# Patient Record
Sex: MALE | Race: WHITE | NOT HISPANIC OR LATINO | Employment: OTHER | URBAN - METROPOLITAN AREA
[De-identification: names, ages, dates, MRNs, and addresses within clinical notes are randomized per-mention and may not be internally consistent; named-entity substitution may affect disease eponyms.]

---

## 2017-02-23 ENCOUNTER — COMMUNICATION - HEALTHEAST (OUTPATIENT)
Dept: FAMILY MEDICINE | Facility: CLINIC | Age: 77
End: 2017-02-23

## 2017-02-24 ENCOUNTER — RECORDS - HEALTHEAST (OUTPATIENT)
Dept: GENERAL RADIOLOGY | Facility: CLINIC | Age: 77
End: 2017-02-24

## 2017-02-24 ENCOUNTER — OFFICE VISIT - HEALTHEAST (OUTPATIENT)
Dept: FAMILY MEDICINE | Facility: CLINIC | Age: 77
End: 2017-02-24

## 2017-02-24 DIAGNOSIS — R05.9 COUGH: ICD-10-CM

## 2017-04-19 ENCOUNTER — RECORDS - HEALTHEAST (OUTPATIENT)
Dept: ADMINISTRATIVE | Facility: OTHER | Age: 77
End: 2017-04-19

## 2017-08-09 ENCOUNTER — AMBULATORY - HEALTHEAST (OUTPATIENT)
Dept: FAMILY MEDICINE | Facility: CLINIC | Age: 77
End: 2017-08-09

## 2017-08-09 ENCOUNTER — COMMUNICATION - HEALTHEAST (OUTPATIENT)
Dept: SCHEDULING | Facility: CLINIC | Age: 77
End: 2017-08-09

## 2017-08-14 ENCOUNTER — COMMUNICATION - HEALTHEAST (OUTPATIENT)
Dept: FAMILY MEDICINE | Facility: CLINIC | Age: 77
End: 2017-08-14

## 2017-08-27 ENCOUNTER — COMMUNICATION - HEALTHEAST (OUTPATIENT)
Dept: FAMILY MEDICINE | Facility: CLINIC | Age: 77
End: 2017-08-27

## 2017-08-27 DIAGNOSIS — E78.5 HYPERLIPIDEMIA: ICD-10-CM

## 2017-08-28 ENCOUNTER — COMMUNICATION - HEALTHEAST (OUTPATIENT)
Dept: FAMILY MEDICINE | Facility: CLINIC | Age: 77
End: 2017-08-28

## 2017-08-30 ENCOUNTER — RECORDS - HEALTHEAST (OUTPATIENT)
Dept: GENERAL RADIOLOGY | Facility: CLINIC | Age: 77
End: 2017-08-30

## 2017-08-30 ENCOUNTER — OFFICE VISIT - HEALTHEAST (OUTPATIENT)
Dept: FAMILY MEDICINE | Facility: CLINIC | Age: 77
End: 2017-08-30

## 2017-08-30 DIAGNOSIS — R05.9 COUGH: ICD-10-CM

## 2017-12-28 ENCOUNTER — OFFICE VISIT - HEALTHEAST (OUTPATIENT)
Dept: FAMILY MEDICINE | Facility: CLINIC | Age: 77
End: 2017-12-28

## 2017-12-28 DIAGNOSIS — N43.3 HYDROCELE: ICD-10-CM

## 2018-01-10 ENCOUNTER — OFFICE VISIT - HEALTHEAST (OUTPATIENT)
Dept: FAMILY MEDICINE | Facility: CLINIC | Age: 78
End: 2018-01-10

## 2018-01-10 DIAGNOSIS — E78.5 HYPERLIPIDEMIA: ICD-10-CM

## 2018-01-10 DIAGNOSIS — Z00.00 HEALTH CARE MAINTENANCE: ICD-10-CM

## 2018-01-10 LAB
ALBUMIN SERPL-MCNC: 3.5 G/DL (ref 3.5–5)
ALP SERPL-CCNC: 73 U/L (ref 45–120)
ALT SERPL W P-5'-P-CCNC: 13 U/L (ref 0–45)
ANION GAP SERPL CALCULATED.3IONS-SCNC: 8 MMOL/L (ref 5–18)
AST SERPL W P-5'-P-CCNC: 19 U/L (ref 0–40)
BILIRUB SERPL-MCNC: 0.6 MG/DL (ref 0–1)
BUN SERPL-MCNC: 15 MG/DL (ref 8–28)
CALCIUM SERPL-MCNC: 9.3 MG/DL (ref 8.5–10.5)
CHLORIDE BLD-SCNC: 108 MMOL/L (ref 98–107)
CHOLEST SERPL-MCNC: 169 MG/DL
CO2 SERPL-SCNC: 27 MMOL/L (ref 22–31)
CREAT SERPL-MCNC: 0.97 MG/DL (ref 0.7–1.3)
ERYTHROCYTE [DISTWIDTH] IN BLOOD BY AUTOMATED COUNT: 13.2 % (ref 11–14.5)
FASTING STATUS PATIENT QL REPORTED: YES
GFR SERPL CREATININE-BSD FRML MDRD: >60 ML/MIN/1.73M2
GLUCOSE BLD-MCNC: 95 MG/DL (ref 70–125)
HCT VFR BLD AUTO: 45.9 % (ref 40–54)
HDLC SERPL-MCNC: 63 MG/DL
HGB BLD-MCNC: 15.1 G/DL (ref 14–18)
LDLC SERPL CALC-MCNC: 95 MG/DL
MCH RBC QN AUTO: 27.7 PG (ref 27–34)
MCHC RBC AUTO-ENTMCNC: 32.9 G/DL (ref 32–36)
MCV RBC AUTO: 84 FL (ref 80–100)
PLATELET # BLD AUTO: 223 THOU/UL (ref 140–440)
PMV BLD AUTO: 7.9 FL (ref 7–10)
POTASSIUM BLD-SCNC: 4.7 MMOL/L (ref 3.5–5)
PROT SERPL-MCNC: 6.5 G/DL (ref 6–8)
RBC # BLD AUTO: 5.45 MILL/UL (ref 4.4–6.2)
SODIUM SERPL-SCNC: 143 MMOL/L (ref 136–145)
TRIGL SERPL-MCNC: 53 MG/DL
WBC: 4.7 THOU/UL (ref 4–11)

## 2018-01-12 ENCOUNTER — COMMUNICATION - HEALTHEAST (OUTPATIENT)
Dept: FAMILY MEDICINE | Facility: CLINIC | Age: 78
End: 2018-01-12

## 2018-03-12 ENCOUNTER — OFFICE VISIT - HEALTHEAST (OUTPATIENT)
Dept: FAMILY MEDICINE | Facility: CLINIC | Age: 78
End: 2018-03-12

## 2018-03-12 DIAGNOSIS — J44.1 COPD WITH EXACERBATION (H): ICD-10-CM

## 2018-03-26 ENCOUNTER — OFFICE VISIT - HEALTHEAST (OUTPATIENT)
Dept: FAMILY MEDICINE | Facility: CLINIC | Age: 78
End: 2018-03-26

## 2018-03-26 DIAGNOSIS — R05.9 COUGH: ICD-10-CM

## 2018-03-26 DIAGNOSIS — J18.9 CAP (COMMUNITY ACQUIRED PNEUMONIA): ICD-10-CM

## 2018-03-26 ASSESSMENT — MIFFLIN-ST. JEOR: SCORE: 1663.81

## 2018-03-27 ENCOUNTER — COMMUNICATION - HEALTHEAST (OUTPATIENT)
Dept: FAMILY MEDICINE | Facility: CLINIC | Age: 78
End: 2018-03-27

## 2018-03-29 ENCOUNTER — COMMUNICATION - HEALTHEAST (OUTPATIENT)
Dept: FAMILY MEDICINE | Facility: CLINIC | Age: 78
End: 2018-03-29

## 2018-03-29 DIAGNOSIS — J44.9 COPD (CHRONIC OBSTRUCTIVE PULMONARY DISEASE) (H): ICD-10-CM

## 2018-04-10 ENCOUNTER — OFFICE VISIT - HEALTHEAST (OUTPATIENT)
Dept: FAMILY MEDICINE | Facility: CLINIC | Age: 78
End: 2018-04-10

## 2018-04-10 DIAGNOSIS — J44.9 COPD (CHRONIC OBSTRUCTIVE PULMONARY DISEASE) (H): ICD-10-CM

## 2018-08-01 ENCOUNTER — RECORDS - HEALTHEAST (OUTPATIENT)
Dept: ADMINISTRATIVE | Facility: OTHER | Age: 78
End: 2018-08-01

## 2018-09-09 ENCOUNTER — COMMUNICATION - HEALTHEAST (OUTPATIENT)
Dept: FAMILY MEDICINE | Facility: CLINIC | Age: 78
End: 2018-09-09

## 2019-02-07 ENCOUNTER — COMMUNICATION - HEALTHEAST (OUTPATIENT)
Dept: FAMILY MEDICINE | Facility: CLINIC | Age: 79
End: 2019-02-07

## 2019-02-07 DIAGNOSIS — G47.00 INSOMNIA: ICD-10-CM

## 2019-02-07 DIAGNOSIS — E78.5 HYPERLIPIDEMIA: ICD-10-CM

## 2019-05-13 ENCOUNTER — COMMUNICATION - HEALTHEAST (OUTPATIENT)
Dept: FAMILY MEDICINE | Facility: CLINIC | Age: 79
End: 2019-05-13

## 2019-05-13 DIAGNOSIS — E78.5 HYPERLIPIDEMIA: ICD-10-CM

## 2019-05-13 DIAGNOSIS — G47.00 INSOMNIA: ICD-10-CM

## 2019-08-15 ENCOUNTER — COMMUNICATION - HEALTHEAST (OUTPATIENT)
Dept: FAMILY MEDICINE | Facility: CLINIC | Age: 79
End: 2019-08-15

## 2019-08-15 DIAGNOSIS — E78.5 HYPERLIPIDEMIA: ICD-10-CM

## 2019-08-15 DIAGNOSIS — G47.00 INSOMNIA: ICD-10-CM

## 2019-09-03 ENCOUNTER — OFFICE VISIT - HEALTHEAST (OUTPATIENT)
Dept: FAMILY MEDICINE | Facility: CLINIC | Age: 79
End: 2019-09-03

## 2019-09-03 DIAGNOSIS — L98.9 SKIN LESION: ICD-10-CM

## 2019-09-03 DIAGNOSIS — Z00.00 HEALTH CARE MAINTENANCE: ICD-10-CM

## 2019-09-03 LAB
ALBUMIN SERPL-MCNC: 3.8 G/DL (ref 3.5–5)
ALP SERPL-CCNC: 72 U/L (ref 45–120)
ALT SERPL W P-5'-P-CCNC: 13 U/L (ref 0–45)
ANION GAP SERPL CALCULATED.3IONS-SCNC: 8 MMOL/L (ref 5–18)
AST SERPL W P-5'-P-CCNC: 19 U/L (ref 0–40)
BILIRUB SERPL-MCNC: 0.6 MG/DL (ref 0–1)
BUN SERPL-MCNC: 19 MG/DL (ref 8–28)
CALCIUM SERPL-MCNC: 9.4 MG/DL (ref 8.5–10.5)
CHLORIDE BLD-SCNC: 106 MMOL/L (ref 98–107)
CHOLEST SERPL-MCNC: 167 MG/DL
CO2 SERPL-SCNC: 26 MMOL/L (ref 22–31)
CREAT SERPL-MCNC: 1.18 MG/DL (ref 0.7–1.3)
ERYTHROCYTE [DISTWIDTH] IN BLOOD BY AUTOMATED COUNT: 12.4 % (ref 11–14.5)
FASTING STATUS PATIENT QL REPORTED: YES
GFR SERPL CREATININE-BSD FRML MDRD: 60 ML/MIN/1.73M2
GLUCOSE BLD-MCNC: 83 MG/DL (ref 70–125)
HCT VFR BLD AUTO: 42.6 % (ref 40–54)
HDLC SERPL-MCNC: 66 MG/DL
HGB BLD-MCNC: 14.3 G/DL (ref 14–18)
LDLC SERPL CALC-MCNC: 89 MG/DL
MCH RBC QN AUTO: 29.2 PG (ref 27–34)
MCHC RBC AUTO-ENTMCNC: 33.6 G/DL (ref 32–36)
MCV RBC AUTO: 87 FL (ref 80–100)
PLATELET # BLD AUTO: 221 THOU/UL (ref 140–440)
PMV BLD AUTO: 7.8 FL (ref 7–10)
POTASSIUM BLD-SCNC: 4.1 MMOL/L (ref 3.5–5)
PROT SERPL-MCNC: 6.3 G/DL (ref 6–8)
RBC # BLD AUTO: 4.91 MILL/UL (ref 4.4–6.2)
SODIUM SERPL-SCNC: 140 MMOL/L (ref 136–145)
TRIGL SERPL-MCNC: 60 MG/DL
WBC: 7.1 THOU/UL (ref 4–11)

## 2019-09-05 ENCOUNTER — COMMUNICATION - HEALTHEAST (OUTPATIENT)
Dept: FAMILY MEDICINE | Facility: CLINIC | Age: 79
End: 2019-09-05

## 2019-09-10 ENCOUNTER — COMMUNICATION - HEALTHEAST (OUTPATIENT)
Dept: FAMILY MEDICINE | Facility: CLINIC | Age: 79
End: 2019-09-10

## 2019-10-30 ENCOUNTER — OFFICE VISIT - HEALTHEAST (OUTPATIENT)
Dept: FAMILY MEDICINE | Facility: CLINIC | Age: 79
End: 2019-10-30

## 2019-10-30 ENCOUNTER — COMMUNICATION - HEALTHEAST (OUTPATIENT)
Dept: FAMILY MEDICINE | Facility: CLINIC | Age: 79
End: 2019-10-30

## 2019-10-30 DIAGNOSIS — J44.9 CHRONIC OBSTRUCTIVE PULMONARY DISEASE, UNSPECIFIED COPD TYPE (H): ICD-10-CM

## 2019-10-30 DIAGNOSIS — Z23 NEED FOR INFLUENZA VACCINATION: ICD-10-CM

## 2019-10-30 DIAGNOSIS — M54.50 ACUTE LEFT-SIDED LOW BACK PAIN WITHOUT SCIATICA: ICD-10-CM

## 2019-11-09 ENCOUNTER — COMMUNICATION - HEALTHEAST (OUTPATIENT)
Dept: FAMILY MEDICINE | Facility: CLINIC | Age: 79
End: 2019-11-09

## 2019-11-09 DIAGNOSIS — E78.5 HYPERLIPIDEMIA: ICD-10-CM

## 2019-11-27 ENCOUNTER — OFFICE VISIT - HEALTHEAST (OUTPATIENT)
Dept: FAMILY MEDICINE | Facility: CLINIC | Age: 79
End: 2019-11-27

## 2019-11-27 ENCOUNTER — RECORDS - HEALTHEAST (OUTPATIENT)
Dept: GENERAL RADIOLOGY | Facility: CLINIC | Age: 79
End: 2019-11-27

## 2019-11-27 DIAGNOSIS — M54.42 LUMBAGO WITH SCIATICA, LEFT SIDE: ICD-10-CM

## 2019-11-27 DIAGNOSIS — G89.29 OTHER CHRONIC PAIN: ICD-10-CM

## 2019-11-27 DIAGNOSIS — M54.42 CHRONIC BILATERAL LOW BACK PAIN WITH BILATERAL SCIATICA: ICD-10-CM

## 2019-11-27 DIAGNOSIS — M54.41 CHRONIC BILATERAL LOW BACK PAIN WITH BILATERAL SCIATICA: ICD-10-CM

## 2019-11-27 DIAGNOSIS — G89.29 CHRONIC BILATERAL LOW BACK PAIN WITH BILATERAL SCIATICA: ICD-10-CM

## 2019-11-27 DIAGNOSIS — M54.41 LUMBAGO WITH SCIATICA, RIGHT SIDE: ICD-10-CM

## 2019-11-27 ASSESSMENT — MIFFLIN-ST. JEOR: SCORE: 1568.55

## 2019-12-04 ENCOUNTER — HOSPITAL ENCOUNTER (OUTPATIENT)
Dept: PHYSICAL MEDICINE AND REHAB | Facility: CLINIC | Age: 79
Discharge: HOME | End: 2019-12-04
Attending: NURSE PRACTITIONER

## 2019-12-04 ENCOUNTER — COMMUNICATION - HEALTHEAST (OUTPATIENT)
Dept: FAMILY MEDICINE | Facility: CLINIC | Age: 79
End: 2019-12-04

## 2019-12-04 ENCOUNTER — HOSPITAL ENCOUNTER (OUTPATIENT)
Dept: MRI IMAGING | Facility: HOSPITAL | Age: 79
Discharge: HOME | End: 2019-12-04

## 2019-12-04 DIAGNOSIS — M54.41 ACUTE BILATERAL LOW BACK PAIN WITH BILATERAL SCIATICA: ICD-10-CM

## 2019-12-04 DIAGNOSIS — M54.42 ACUTE BILATERAL LOW BACK PAIN WITH BILATERAL SCIATICA: ICD-10-CM

## 2019-12-04 DIAGNOSIS — M51.369 DDD (DEGENERATIVE DISC DISEASE), LUMBAR: ICD-10-CM

## 2019-12-04 DIAGNOSIS — M41.9 SCOLIOSIS OF LUMBAR SPINE, UNSPECIFIED SCOLIOSIS TYPE: ICD-10-CM

## 2019-12-05 ENCOUNTER — COMMUNICATION - HEALTHEAST (OUTPATIENT)
Dept: PHYSICAL MEDICINE AND REHAB | Facility: CLINIC | Age: 79
End: 2019-12-05

## 2019-12-05 ENCOUNTER — COMMUNICATION - HEALTHEAST (OUTPATIENT)
Dept: FAMILY MEDICINE | Facility: CLINIC | Age: 79
End: 2019-12-05

## 2019-12-05 ENCOUNTER — AMBULATORY - HEALTHEAST (OUTPATIENT)
Dept: PHYSICAL MEDICINE AND REHAB | Facility: CLINIC | Age: 79
End: 2019-12-05

## 2019-12-05 DIAGNOSIS — M54.41 ACUTE BILATERAL LOW BACK PAIN WITH BILATERAL SCIATICA: ICD-10-CM

## 2019-12-05 DIAGNOSIS — M54.42 ACUTE BILATERAL LOW BACK PAIN WITH BILATERAL SCIATICA: ICD-10-CM

## 2019-12-05 DIAGNOSIS — M48.061 SPINAL STENOSIS, LUMBAR REGION, WITHOUT NEUROGENIC CLAUDICATION: ICD-10-CM

## 2019-12-05 DIAGNOSIS — M51.369 DDD (DEGENERATIVE DISC DISEASE), LUMBAR: ICD-10-CM

## 2019-12-05 DIAGNOSIS — M48.061 LUMBAR FORAMINAL STENOSIS: ICD-10-CM

## 2019-12-12 ENCOUNTER — HOSPITAL ENCOUNTER (OUTPATIENT)
Dept: PHYSICAL MEDICINE AND REHAB | Facility: CLINIC | Age: 79
Discharge: HOME | End: 2019-12-12
Attending: PAIN MEDICINE

## 2019-12-12 DIAGNOSIS — M48.061 SPINAL STENOSIS, LUMBAR REGION, WITHOUT NEUROGENIC CLAUDICATION: ICD-10-CM

## 2019-12-12 DIAGNOSIS — M51.369 DDD (DEGENERATIVE DISC DISEASE), LUMBAR: ICD-10-CM

## 2019-12-12 DIAGNOSIS — M54.42 ACUTE BILATERAL LOW BACK PAIN WITH BILATERAL SCIATICA: ICD-10-CM

## 2019-12-12 DIAGNOSIS — M48.061 LUMBAR FORAMINAL STENOSIS: ICD-10-CM

## 2019-12-12 DIAGNOSIS — M54.41 ACUTE BILATERAL LOW BACK PAIN WITH BILATERAL SCIATICA: ICD-10-CM

## 2019-12-16 ENCOUNTER — COMMUNICATION - HEALTHEAST (OUTPATIENT)
Dept: PHYSICAL MEDICINE AND REHAB | Facility: CLINIC | Age: 79
End: 2019-12-16

## 2020-03-12 ENCOUNTER — RECORDS - HEALTHEAST (OUTPATIENT)
Dept: ADMINISTRATIVE | Facility: OTHER | Age: 80
End: 2020-03-12

## 2020-03-16 ENCOUNTER — RECORDS - HEALTHEAST (OUTPATIENT)
Dept: ADMINISTRATIVE | Facility: OTHER | Age: 80
End: 2020-03-16

## 2020-03-24 ENCOUNTER — RECORDS - HEALTHEAST (OUTPATIENT)
Dept: ADMINISTRATIVE | Facility: OTHER | Age: 80
End: 2020-03-24

## 2020-05-26 ENCOUNTER — RECORDS - HEALTHEAST (OUTPATIENT)
Dept: ADMINISTRATIVE | Facility: OTHER | Age: 80
End: 2020-05-26

## 2020-07-15 ENCOUNTER — RECORDS - HEALTHEAST (OUTPATIENT)
Dept: ADMINISTRATIVE | Facility: OTHER | Age: 80
End: 2020-07-15

## 2020-08-16 ENCOUNTER — COMMUNICATION - HEALTHEAST (OUTPATIENT)
Dept: FAMILY MEDICINE | Facility: CLINIC | Age: 80
End: 2020-08-16

## 2020-08-16 DIAGNOSIS — G47.00 INSOMNIA: ICD-10-CM

## 2020-10-12 ENCOUNTER — OFFICE VISIT - HEALTHEAST (OUTPATIENT)
Dept: FAMILY MEDICINE | Facility: CLINIC | Age: 80
End: 2020-10-12

## 2020-10-12 DIAGNOSIS — L57.0 AK (ACTINIC KERATOSIS): ICD-10-CM

## 2020-10-12 DIAGNOSIS — G47.00 INSOMNIA, UNSPECIFIED TYPE: ICD-10-CM

## 2020-10-12 DIAGNOSIS — E78.00 PURE HYPERCHOLESTEROLEMIA: ICD-10-CM

## 2020-10-12 DIAGNOSIS — E78.00 HYPERCHOLESTEREMIA: ICD-10-CM

## 2020-10-12 DIAGNOSIS — E78.5 HYPERLIPIDEMIA: ICD-10-CM

## 2020-10-12 DIAGNOSIS — G47.00 INSOMNIA: ICD-10-CM

## 2020-10-12 DIAGNOSIS — J44.9 CHRONIC OBSTRUCTIVE PULMONARY DISEASE, UNSPECIFIED COPD TYPE (H): ICD-10-CM

## 2020-10-12 LAB
ALBUMIN SERPL-MCNC: 3.7 G/DL (ref 3.5–5)
ALP SERPL-CCNC: 77 U/L (ref 45–120)
ALT SERPL W P-5'-P-CCNC: 19 U/L (ref 0–45)
ANION GAP SERPL CALCULATED.3IONS-SCNC: 7 MMOL/L (ref 5–18)
AST SERPL W P-5'-P-CCNC: 25 U/L (ref 0–40)
BILIRUB SERPL-MCNC: 0.7 MG/DL (ref 0–1)
BUN SERPL-MCNC: 11 MG/DL (ref 8–28)
CALCIUM SERPL-MCNC: 8.9 MG/DL (ref 8.5–10.5)
CHLORIDE BLD-SCNC: 104 MMOL/L (ref 98–107)
CHOLEST SERPL-MCNC: 181 MG/DL
CO2 SERPL-SCNC: 28 MMOL/L (ref 22–31)
CREAT SERPL-MCNC: 0.87 MG/DL (ref 0.7–1.3)
FASTING STATUS PATIENT QL REPORTED: NO
GFR SERPL CREATININE-BSD FRML MDRD: >60 ML/MIN/1.73M2
GLUCOSE BLD-MCNC: 91 MG/DL (ref 70–125)
HDLC SERPL-MCNC: 71 MG/DL
LDLC SERPL CALC-MCNC: 94 MG/DL
POTASSIUM BLD-SCNC: 4 MMOL/L (ref 3.5–5)
PROT SERPL-MCNC: 6.2 G/DL (ref 6–8)
SODIUM SERPL-SCNC: 139 MMOL/L (ref 136–145)
TRIGL SERPL-MCNC: 81 MG/DL

## 2020-10-12 RX ORDER — LOVASTATIN 40 MG
40 TABLET ORAL AT BEDTIME
Qty: 90 TABLET | Refills: 3 | Status: SHIPPED | OUTPATIENT
Start: 2020-10-12 | End: 2021-11-23

## 2020-10-13 ENCOUNTER — COMMUNICATION - HEALTHEAST (OUTPATIENT)
Dept: FAMILY MEDICINE | Facility: CLINIC | Age: 80
End: 2020-10-13

## 2020-11-16 ENCOUNTER — COMMUNICATION - HEALTHEAST (OUTPATIENT)
Dept: FAMILY MEDICINE | Facility: CLINIC | Age: 80
End: 2020-11-16

## 2020-11-16 DIAGNOSIS — G47.00 INSOMNIA: ICD-10-CM

## 2020-11-17 RX ORDER — TRAZODONE HYDROCHLORIDE 50 MG/1
TABLET, FILM COATED ORAL
Qty: 90 TABLET | Refills: 1 | Status: SHIPPED | OUTPATIENT
Start: 2020-11-17 | End: 2022-02-14

## 2021-01-19 ENCOUNTER — RECORDS - HEALTHEAST (OUTPATIENT)
Dept: ADMINISTRATIVE | Facility: OTHER | Age: 81
End: 2021-01-19

## 2021-02-23 ENCOUNTER — IMMUNIZATION (OUTPATIENT)
Dept: NURSING | Facility: CLINIC | Age: 81
End: 2021-02-23
Payer: COMMERCIAL

## 2021-02-23 PROCEDURE — 0001A PR COVID VAC PFIZER DIL RECON 30 MCG/0.3 ML IM: CPT

## 2021-02-23 PROCEDURE — 91300 PR COVID VAC PFIZER DIL RECON 30 MCG/0.3 ML IM: CPT

## 2021-03-16 ENCOUNTER — IMMUNIZATION (OUTPATIENT)
Dept: NURSING | Facility: CLINIC | Age: 81
End: 2021-03-16
Attending: FAMILY MEDICINE
Payer: COMMERCIAL

## 2021-03-16 PROCEDURE — 91300 PR COVID VAC PFIZER DIL RECON 30 MCG/0.3 ML IM: CPT

## 2021-03-16 PROCEDURE — 0002A PR COVID VAC PFIZER DIL RECON 30 MCG/0.3 ML IM: CPT

## 2021-04-09 ENCOUNTER — RECORDS - HEALTHEAST (OUTPATIENT)
Dept: ADMINISTRATIVE | Facility: OTHER | Age: 81
End: 2021-04-09

## 2021-05-28 NOTE — TELEPHONE ENCOUNTER
RN cannot approve Refill Request    RN can NOT refill this medication PCP messaged that patient is overdue for Office Visit.       Martha Tubbs, [facility] Triage/Med Refill 5/13/2019    Requested Prescriptions   Pending Prescriptions Disp Refills     lovastatin (MEVACOR) 40 MG tablet [Pharmacy Med Name: LOVASTATIN 40MG TABLETS] 90 tablet 0     Sig: TAKE 1 TABLET(40 MG) BY MOUTH AT BEDTIME       Statins Refill Protocol (Hmg CoA Reductase Inhibitors) Failed - 5/13/2019  3:20 AM        Failed - PCP or prescribing provider visit in past 12 months      Last office visit with prescriber/PCP: 4/10/2018 Gera Mehta MD OR same dept: Visit date not found OR same specialty: 4/10/2018 Gera Mheta MD  Last physical: Visit date not found Last MTM visit: Visit date not found   Next visit within 3 mo: Visit date not found  Next physical within 3 mo: Visit date not found  Prescriber OR PCP: Gera Mehta MD  Last diagnosis associated with med order: 1. Hyperlipidemia  - lovastatin (MEVACOR) 40 MG tablet [Pharmacy Med Name: LOVASTATIN 40MG TABLETS]; TAKE 1 TABLET(40 MG) BY MOUTH AT BEDTIME  Dispense: 90 tablet; Refill: 0    2. Insomnia  - traZODone (DESYREL) 50 MG tablet [Pharmacy Med Name: TRAZODONE 50MG TABLETS]; TAKE 1 TABLET(50 MG) BY MOUTH AT BEDTIME  Dispense: 90 tablet; Refill: 0    If protocol passes may refill for 12 months if within 3 months of last provider visit (or a total of 15 months).             traZODone (DESYREL) 50 MG tablet [Pharmacy Med Name: TRAZODONE 50MG TABLETS] 90 tablet 0     Sig: TAKE 1 TABLET(50 MG) BY MOUTH AT BEDTIME       Tricyclics/Misc Antidepressant/Antianxiety Meds Refill Protocol Failed - 5/13/2019  3:20 AM        Failed - PCP or prescribing provider visit in last year     Last office visit with prescriber/PCP: 4/10/2018 Gera Mehta MD OR same dept: Visit date not found OR same specialty: 4/10/2018 Gera Mehta MD  Last physical: Visit  date not found Last MTM visit: Visit date not found   Next visit within 3 mo: Visit date not found  Next physical within 3 mo: Visit date not found  Prescriber OR PCP: Gera Mehta MD  Last diagnosis associated with med order: 1. Hyperlipidemia  - lovastatin (MEVACOR) 40 MG tablet [Pharmacy Med Name: LOVASTATIN 40MG TABLETS]; TAKE 1 TABLET(40 MG) BY MOUTH AT BEDTIME  Dispense: 90 tablet; Refill: 0    2. Insomnia  - traZODone (DESYREL) 50 MG tablet [Pharmacy Med Name: TRAZODONE 50MG TABLETS]; TAKE 1 TABLET(50 MG) BY MOUTH AT BEDTIME  Dispense: 90 tablet; Refill: 0    If protocol passes may refill for 12 months if within 3 months of last provider visit (or a total of 15 months).

## 2021-05-30 VITALS — WEIGHT: 203 LBS | BODY MASS INDEX: 29.98 KG/M2

## 2021-05-31 ENCOUNTER — RECORDS - HEALTHEAST (OUTPATIENT)
Dept: ADMINISTRATIVE | Facility: CLINIC | Age: 81
End: 2021-05-31

## 2021-05-31 VITALS — BODY MASS INDEX: 30.48 KG/M2 | WEIGHT: 206.4 LBS

## 2021-05-31 VITALS — WEIGHT: 209 LBS | BODY MASS INDEX: 30.86 KG/M2

## 2021-05-31 VITALS — WEIGHT: 202 LBS | BODY MASS INDEX: 29.83 KG/M2

## 2021-05-31 NOTE — TELEPHONE ENCOUNTER
RN cannot approve Refill Request    RN can NOT refill this medication Protocol failed and NO refill given.       Martha Tubbs, [facility] Triage/Med Refill 8/15/2019    Requested Prescriptions   Pending Prescriptions Disp Refills     lovastatin (MEVACOR) 40 MG tablet [Pharmacy Med Name: LOVASTATIN 40MG TABLETS] 90 tablet 0     Sig: TAKE 1 TABLET(40 MG) BY MOUTH AT BEDTIME       Statins Refill Protocol (Hmg CoA Reductase Inhibitors) Failed - 8/15/2019  3:20 AM        Failed - PCP or prescribing provider visit in past 12 months      Last office visit with prescriber/PCP: 4/10/2018 Gera Mehta MD OR same dept: Visit date not found OR same specialty: 4/10/2018 Gera Mehta MD  Last physical: Visit date not found Last [place] visit: Visit date not found   Next visit within 3 mo: Visit date not found  Next physical within 3 mo: Visit date not found  Prescriber OR PCP: Gera Mehta MD  Last diagnosis associated with med order: 1. Hyperlipidemia  - lovastatin (MEVACOR) 40 MG tablet [Pharmacy Med Name: LOVASTATIN 40MG TABLETS]; TAKE 1 TABLET(40 MG) BY MOUTH AT BEDTIME  Dispense: 90 tablet; Refill: 0    2. Insomnia  - traZODone (DESYREL) 50 MG tablet [Pharmacy Med Name: TRAZODONE 50MG TABLETS]; TAKE 1 TABLET(50 MG) BY MOUTH AT BEDTIME  Dispense: 90 tablet; Refill: 0    If protocol passes may refill for 12 months if within 3 months of last provider visit (or a total of 15 months).             traZODone (DESYREL) 50 MG tablet [Pharmacy Med Name: TRAZODONE 50MG TABLETS] 90 tablet 0     Sig: TAKE 1 TABLET(50 MG) BY MOUTH AT BEDTIME       Tricyclics/Misc Antidepressant/Antianxiety Meds Refill Protocol Failed - 8/15/2019  3:20 AM        Failed - PCP or prescribing provider visit in last year     Last office visit with prescriber/PCP: 4/10/2018 Gera Mehta MD OR same dept: Visit date not found OR same specialty: 4/10/2018 Gera Mehta MD  Last physical: Visit date not found Last  MTM visit: Visit date not found   Next visit within 3 mo: Visit date not found  Next physical within 3 mo: Visit date not found  Prescriber OR PCP: Gera Mehta MD  Last diagnosis associated with med order: 1. Hyperlipidemia  - lovastatin (MEVACOR) 40 MG tablet [Pharmacy Med Name: LOVASTATIN 40MG TABLETS]; TAKE 1 TABLET(40 MG) BY MOUTH AT BEDTIME  Dispense: 90 tablet; Refill: 0    2. Insomnia  - traZODone (DESYREL) 50 MG tablet [Pharmacy Med Name: TRAZODONE 50MG TABLETS]; TAKE 1 TABLET(50 MG) BY MOUTH AT BEDTIME  Dispense: 90 tablet; Refill: 0    If protocol passes may refill for 12 months if within 3 months of last provider visit (or a total of 15 months).

## 2021-06-01 VITALS — WEIGHT: 208.2 LBS | BODY MASS INDEX: 30.75 KG/M2

## 2021-06-01 VITALS — WEIGHT: 209.25 LBS | BODY MASS INDEX: 30.9 KG/M2

## 2021-06-01 VITALS — WEIGHT: 213.5 LBS | BODY MASS INDEX: 31.62 KG/M2 | HEIGHT: 69 IN

## 2021-06-01 NOTE — TELEPHONE ENCOUNTER
Test Results  Who is calling?:  Patient  Who ordered the test:  Dr. Mehta  Type of test: Lab and Other: Skin lesion removal  Date of test:  9/5/19  Where was the test performed:  [place]  What are your questions/concerns?:  Patient had a missed call and is asking if he had any results. Reviewed patient's BMP and HM2 were normal. Did patient have skin lesion results, please reach out to patient and advise.  Okay to leave a detailed message?:  Yes [phone]

## 2021-06-01 NOTE — TELEPHONE ENCOUNTER
----- Message from Gera Mehta MD sent at 9/9/2019 12:35 PM CDT -----  Kidney and liver function are normal with no signs of diabetes.  Cholesterol levels are at goal range.  No concerns on blood work.

## 2021-06-01 NOTE — PROGRESS NOTES
ASSESSMENT/PLAN  1. Health care maintenance  Patient is due for blood work and will obtain blood work today  - HM2(CBC w/o Differential)  - Comprehensive Metabolic Panel  - Lipid Cascade FASTING    2. Skin lesion  Patient has a broken blood vessel and bruising on his right forearm  Discussed monitoring at this time  There is no vesicles or papules or raised lesion  If not healing he will contact me at clinic        SUBJECTIVE:   Chief Complaint   Patient presents with     Skin/SubQ Lesion     Skin spot on right forearm, not healing      Allen Simond 78 y.o. male    Current Outpatient Medications   Medication Sig Dispense Refill     albuterol (PROVENTIL) 2.5 mg /3 mL (0.083 %) nebulizer solution Take 3 mL (2.5 mg total) by nebulization every 6 (six) hours as needed for wheezing. 75 mL 1     albuterol (VENTOLIN HFA) 90 mcg/actuation inhaler Inhale 2 puffs every 4 (four) hours as needed for wheezing (J44.9). 1 Inhaler 0     fluticasone-salmeterol (ADVAIR HFA) 230-21 mcg/actuation inhaler Inhale 2 puffs 2 (two) times a day. 1 Inhaler 3     lovastatin (MEVACOR) 40 MG tablet TAKE 1 TABLET(40 MG) BY MOUTH AT BEDTIME 90 tablet 0     nebulizers Misc Use as directed: q 6 hrs PRN. 1 each 0     sildenafil (VIAGRA) 100 MG tablet Take 100 mg by mouth daily as needed for erectile dysfunction. 1 hour before needed.       traZODone (DESYREL) 50 MG tablet TAKE 1 TABLET(50 MG) BY MOUTH AT BEDTIME 90 tablet 0     No current facility-administered medications for this visit.      Allergies: Patient has no known allergies.   No LMP for male patient.    HPI:   The patient has a lesion on his right forearm they want to have evaluated  Seems consistent with a broken blood vessel/bruising  No raised lesions or irregularities otherwise  Discussed monitoring and this will likely resolve on its own  Patient is fasting and due for fasting blood work which will obtain today and follow-up based on results    ROS: negative except as per  HPI    OBJECTIVE:   The patient appears well, alert, oriented x 3, in no distress.  /72 (Patient Site: Left Arm, Patient Position: Sitting, Cuff Size: Adult Large)   Pulse 79   Temp 97.3  F (36.3  C) (Oral)   Resp 16   Wt 203 lb 3.2 oz (92.2 kg)   BMI 30.01 kg/m      Lungs: clear, good air entry, no wheezes, rhonchi or rales.   Cardiac: S1 and S2 normal, no murmurs, regular rate and rhythm.   Extremities: show no edema, normal peripheral pulses.   Neurological: normal, no focal findings.  Skin: flat, red lesion irregular R forearm- no vesicles, no papules- lesion appears as bruising  Psych- normal mood and affect      Pt states an understanding and agrees to the above plan.

## 2021-06-02 ENCOUNTER — RECORDS - HEALTHEAST (OUTPATIENT)
Dept: ADMINISTRATIVE | Facility: CLINIC | Age: 81
End: 2021-06-02

## 2021-06-02 NOTE — TELEPHONE ENCOUNTER
Medication Question or Clarification  Who is calling: Pharmacy: fax  What medication are you calling about? (include dose and sig)   MG capsule  2 mg, Oral, 3 times daily         Summary: Take 1 capsule (2 mg total) by mouth 3 (three) times a day., Starting Wed 10/30/2019, Normal   Dose, Frequency: 2 mg, 3 times daily  Start: 10/30/2019  Ord/Sold: 10/30/2019 (O)  Report  Adh:   Taking:   Long-term:   Pharmacy: [id number] - [place], [place] - [address] [place] [address] DR AT [facility] [address]  Med Dose History        Who prescribed the medication?: Stephenie Beth CNP  What is your question/concern?: The insurance did not approve the tizanidine.  The alternativ offered is balofen.  Does provider want the alternative or PA started?   Pharmacy: [facility] [phone]  Okay to leave a detailed message?: Yes  Site CMT - Please call the pharmacy to obtain any additional needed information.
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
## 2021-06-02 NOTE — PATIENT INSTRUCTIONS - HE
The Science of a Long Life by Dr Shine Monique    Take Acetaminophen (Tylenol) 325-650 mg every 4-6 hours as needed for pain or fever. Do not take more than 4000 mg in a 24 hour period.

## 2021-06-03 VITALS
DIASTOLIC BLOOD PRESSURE: 72 MMHG | TEMPERATURE: 97.3 F | RESPIRATION RATE: 16 BRPM | HEART RATE: 79 BPM | BODY MASS INDEX: 30.01 KG/M2 | WEIGHT: 203.2 LBS | SYSTOLIC BLOOD PRESSURE: 118 MMHG

## 2021-06-03 VITALS
RESPIRATION RATE: 16 BRPM | SYSTOLIC BLOOD PRESSURE: 138 MMHG | HEART RATE: 73 BPM | TEMPERATURE: 97.1 F | DIASTOLIC BLOOD PRESSURE: 73 MMHG | BODY MASS INDEX: 29.86 KG/M2 | WEIGHT: 202.2 LBS
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
## 2021-06-03 NOTE — TELEPHONE ENCOUNTER
Refill Approved    Rx renewed per Medication Renewal Policy. Medication was last renewed on 8/15/2019.  Last office visit: 10[facility]30/2019 [facility] 11/9/2019     Requested Prescriptions   Pending Prescriptions Disp Refills     lovastatin (MEVACOR) 40 MG tablet [Pharmacy Med Name: LOVASTATIN 40MG TABLETS] 90 tablet 0     Sig: TAKE 1 TABLET(40 MG) BY MOUTH AT BEDTIME       Statins Refill Protocol (Hmg CoA Reductase Inhibitors) Passed - 11/9/2019  3:15 PM        Passed - PCP or prescribing provider visit in past 12 months      Last office visit with prescriber/PCP: 9/3/2019 Gera Mehta MD OR same dept: 10/30/2019 Stephenie Beth CNP OR same specialty: 10/30/2019 Stephenie Beth CNP  Last physical: Visit date not found Last MTM visit: Visit date not found   Next visit within 3 mo: Visit date not found  Next physical within 3 mo: Visit date not found  Prescriber OR PCP: Gera Mehta MD  Last diagnosis associated with med order: 1. Hyperlipidemia  - lovastatin (MEVACOR) 40 MG tablet [Pharmacy Med Name: LOVASTATIN 40MG TABLETS]; TAKE 1 TABLET(40 MG) BY MOUTH AT BEDTIME  Dispense: 90 tablet; Refill: 0    If protocol passes may refill for 12 months if within 3 months of last provider visit (or a total of 15 months).

## 2021-06-03 NOTE — TELEPHONE ENCOUNTER
Who is calling:  Gaurav with [facility], [phone]  Reason for Call:    Gaurav is requesting the XR Lumbar Spine 2 or 3 VWS, Images and Report done 11/27/19 to be sent to [facility].  Thank you.  Date of last appointment with primary care: 11/27/19  Okay to leave a detailed message: Yes

## 2021-06-03 NOTE — PATIENT INSTRUCTIONS - HE
[facility] Scheduling    Please call [phone] to schedule your image(s) (select option #1). There are 3 different locations, see below. You can do walk-in visits for xray only images if you want.     [address] [address]    [facility]   [address] [address]    [place] [id number] [facility] [address]    Gabapentin Dosing Chart  DATE  MORNING AFTERNOON BEDTIME    Today 0 0 1     1 0 1     1 1 1     1 1 2     2 1 2     2 2 2     2 2 3     3 2 3     3 3 3

## 2021-06-03 NOTE — TELEPHONE ENCOUNTER
Left message letting them know that this needs to be requested through the [facility] and they can call them at [phone] to request.
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
## 2021-06-04 VITALS — BODY MASS INDEX: 33.09 KG/M2 | WEIGHT: 205 LBS

## 2021-06-04 VITALS
RESPIRATION RATE: 20 BRPM | HEIGHT: 66 IN | BODY MASS INDEX: 32.62 KG/M2 | DIASTOLIC BLOOD PRESSURE: 62 MMHG | HEART RATE: 79 BPM | SYSTOLIC BLOOD PRESSURE: 126 MMHG | WEIGHT: 203 LBS | TEMPERATURE: 97.1 F

## 2021-06-04 NOTE — TELEPHONE ENCOUNTER
----- Message from Stephenie Beth CNP sent at 12/5/2019 12:39 PM CST -----  Please call patient with results of x-ray. He has severe disc space narrowing at L3-L5 which can be painful in lower back. Also has moderate disc space narrowing of the L1-L2, L2-3, and L5-S1. Some moderate arthritis between L3-S1. All of this can be causing his pain. Will have him follow with the [facility] for treatment options.

## 2021-06-04 NOTE — PATIENT INSTRUCTIONS - HE
DISCHARGE INSTRUCTIONS    During office hours (8:00 a.m.- 4:00 p.m.) questions or concerns may be answered  by calling [facility] Center Navigation Nurses at  [phone].  Messages received after hours will be returned the following business day.      In the case of an emergency, please dial 911 or seek assistance at the nearest Emergency Room/Urgent Care facility.     All Patients:    ? You may experience an increase in your symptoms for the first 2 days (It may take anywhere between 2 days- 2 weeks for the steroid to have maximum effect).    ? You may use ice on the injection site, as frequently as 20 minutes each hour if needed.    ? You may take your pain medicine.    ? You may continue taking your regular medication after your injection. If you have had a Medial Branch Block you may resume pain medication once your pain diary is completed.    ? You may shower. No swimming, tub bath or hot tub for 48 hours.  You may remove your bandaid/bandage as soon as you are home.    ? You may resume light activities, as tolerated.    ? Resume your usual diet as tolerated.    ? It is strongly advised that you do not drive for 1-3 hours post injection.    ? If you have had oral sedation:  Do not drive for 8 hours post injection.      ? If you have had IV sedation:  Do not drive for 24 hours post injection.  Do not operate hazardous machinery or make important personal/business decisions for 24 hours.      POSSIBLE STEROID SIDE EFFECTS (If steroid/cortisone was used for your procedure)    -If you experience these symptoms, it should only last for a short period      Swelling of the legs                Skin redness (flushing)       Mouth (oral) irritation     Blood sugar (glucose) levels              Sweats                      Mood changes    Headache    Sleeplessness         POSSIBLE PROCEDURE SIDE EFFECTS  -Call the Spine Center if you are concerned    Increased Pain             Increased numbness/tingling         Nausea/Vomiting            Bruising/bleeding at site        Redness or swelling                                                Difficulty walking        Weakness             Fever greater than 100.5    *In the event of a severe headache after an epidural steroid injection that is relieved by lying down, please call the [facility] to speak with a clinical staff member*

## 2021-06-04 NOTE — TELEPHONE ENCOUNTER
----- Message from Sara Lassiter CNP sent at 12/5/2019  7:23 AM CST -----  Please call patient and notify him that I did review his lumbar spine MRI which does show severe spinal canal stenosis at L4-5 due to disc osteophyte complex and facet arthritis, moderate spinal canal stenosis noted at L5-S1.  As we discussed we could trial a bilateral L4-5 transforaminal epidural steroid injection see if we get further relief of his back and buttock pain, I did place an order for this injection since his pain is severe and he can schedule at this time.  If he does not get relief with this injection we could trial a bilateral L5-S1 TFESI versus surgical referral as he would be a surgical candidate if he gets no relief with conservative care.

## 2021-06-04 NOTE — TELEPHONE ENCOUNTER
Phone call to patient to review results and provider's recommendations. Results given and explained. Discussed the offered/recommended injection. Patient would like to try this. Injection requirements reviewed with patient. Stated understanding. Transferred to scheduling to make appointment.

## 2021-06-04 NOTE — TELEPHONE ENCOUNTER
"Patient calling as he was told by pharmacy he should be taking the Gabapentin 1 capsule three times a day and he is taking 3 three times a day. He is not seeing any change in his pain and wants to come off. Explained provider would be updated, but he should not just stop the medication. He should slowly decrease the medication (i.e. 2 three times a day today, 1 three times a day tomorrow, 1 two times a day the next day, then 1 daily, then off.) Stated understanding.     He reports he does not have the pain he had prior to the injection of 12/12, but continues to have stiffness. \"It is what it is. I'll call if I need to come back.\"   "

## 2021-06-04 NOTE — TELEPHONE ENCOUNTER
Patient Returning Call  Reason for call:  Results  Information relayed to patient: The writer read the following to patient per Stephenie Beth CNP: Please call patient with results of x-ray. He has severe disc space narrowing at L3-L5 which can be painful in lower back. Also has moderate disc space narrowing of the L1-L2, L2-3, and L5-S1. Some moderate arthritis between L3-S1. All of this can be causing his pain. Will have him follow with the [facility] for treatment options.   Patient has additional questions:  Yes  If YES, what are your questions/concerns:  Patient  has an appointment next week Thursday to get a steroid injection in his back he states.  Okay to leave a detailed message?: No call back needed

## 2021-06-05 VITALS
HEART RATE: 64 BPM | DIASTOLIC BLOOD PRESSURE: 67 MMHG | OXYGEN SATURATION: 96 % | RESPIRATION RATE: 24 BRPM | TEMPERATURE: 96.9 F | WEIGHT: 189.8 LBS | BODY MASS INDEX: 30.63 KG/M2 | SYSTOLIC BLOOD PRESSURE: 134 MMHG

## 2021-06-09 NOTE — PROGRESS NOTES
ASSESSMENT & PLAN:  1.Cough  Suspect bronchitis  No signs of pneumonia on xray- white blood cell count normal  Will start z-pack  Cont inhaler  F/u if not improving at clinic      There are no Patient Instructions on file for this visit.    Orders Placed This Encounter   Procedures     XR Chest PA and Lateral     Standing Status:   Future     Number of Occurrences:   1     Standing Expiration Date:   2/25/2018     Order Specific Question:   Can the procedure be changed per Radiologist protocol?     Answer:   Yes     HM1 (CBC with Diff)     Medications Discontinued During This Encounter   Medication Reason     traZODone (DESYREL) 50 MG tablet Reorder       No Follow-up on file.    CHIEF COMPLAINT:  Chief Complaint   Patient presents with     URI     Coughing X3 weeks with shortness of breath, wheezing, nasal congestion        HISTORY OF PRESENT ILLNESS:  Allen is a 76 y.o. male presenting to the clinic today with complaints of cough, shortness of breath, and congestion. He has had constant rhinorrhea and a productive cough for the past couple of weeks. The phlegm is thick in consistency, so it is quite difficult for him to clear his throat. He has had some chills, fatigue, and fever. He initially had body aches, but those seem to be resolving. He is short of breath and has noticed himself wheezing. He has been using cough drops, but he has not taken any other over the counter medications. He has been coughing throughout the night and would like something better for the cough. He has had pneumonia many times in the past. His last pneumonia was a few years ago. He used to get it more frequently when he was smoking, but he has since quit. His chest X-ray from today shows fluid in the right lower lobe, but his white blood cell count is normal. He has an albuterol inhaler, which he has been using. He will start a Z-Gael.     REVIEW OF SYSTEMS:   He denies nausea, vomiting, or diarrhea. All other systems are  negative.    PFSH:  He quit smoking.     TOBACCO USE:  History   Smoking Status     Former Smoker     Quit date: 8/17/2002   Smokeless Tobacco     Not on file     Comment: quit apx 13yrs ago       VITALS:  Vitals:    02/24/17 0851   BP: 124/70   Patient Site: Left Arm   Patient Position: Sitting   Cuff Size: Adult Large   Pulse: 66   Resp: 18   Temp: 98.1  F (36.7  C)   TempSrc: Oral   Weight: 203 lb (92.1 kg)     Wt Readings from Last 3 Encounters:   02/24/17 203 lb (92.1 kg)   11/01/16 203 lb (92.1 kg)   06/21/16 211 lb (95.7 kg)       PHYSICAL EXAM:  GENERAL APPEARANCE: Alert, cooperative, no distress, appears stated age   LUNGS: Wheezing throughout lung fields, coarse rhonchi in left lower lobe.   HEART: Regular rate and rhythm, S1 and S2 normal, no murmur, rub, or gallop. No lower extremity edema.   NEUROLOGIC: No gross focal deficits  PSYCHOLOGIC: Normal mood and affect    Chest X-ray: Fluid in right lower lobe, no focalized infiltrate- some atelectasis      ADDITIONAL HISTORY SUMMARIZED (FROM OLD RECORDS OR HISTORY FROM SOMEONE OTHER THAN THE PATIENT OR ANOTHER HEALTHCARE PROVIDER) (2 TOTAL): None.     DECISION TO OBTAIN EXTRA INFORMATION (OLD RECORDS REQUESTED OR HISTORY FROM ANOTHER PERSON OR ACCESSING CARE EVERYWHERE) (1 TOTAL): None.     RADIOLOGY TESTS SUMMARIZED OR ORDERED (XRAY/CT/MRI/DXA) (1 TOTAL): Chest X-ray ordered.     LABS REVIEWED OR ORDERED (1 TOTAL): Labs ordered and reviewed today.    MEDICINE TESTS SUMMARIZED OR ORDERED (EKG/ECHO) (1 TOTAL): None.    INDEPENDENT REVIEW OF EKG OR X-RAY (2 EACH): Chest X-ray from today personally interpreted.     The visit lasted a total of 15 minutes face to face with the patient. Over 50% of the time was spent counseling and educating the patient about his cough and congestion.    Colton CABAN, am scribing for and in the presence of, Dr. Mehta.    IDr. Mehta, personally performed the services described in this documentation, as scribed by  Colton Peterson in my presence, and it is both accurate and complete.    MEDICATIONS:  Current Outpatient Prescriptions   Medication Sig Dispense Refill     lovastatin (MEVACOR) 40 MG tablet Take 1 tablet (40 mg total) by mouth bedtime. 90 tablet 3     sildenafil (VIAGRA) 100 MG tablet Take 100 mg by mouth daily as needed for erectile dysfunction. 1 hour before needed.       traZODone (DESYREL) 50 MG tablet Take 1 tablet (50 mg total) by mouth bedtime. 180 tablet 1     azithromycin (ZITHROMAX Z-SARAH) 250 MG tablet Take 2 tablets (500 mg) on  Day 1,  followed by 1 tablet (250 mg) once daily on Days 2 through 5. 6 tablet 0     codeine-guaiFENesin (GUAIFENESIN AC)  mg/5 mL liquid Take 5 mL by mouth 2 (two) times a day as needed for cough. 240 mL 0     predniSONE (DELTASONE) 20 MG tablet Take 1 tablet (20 mg total) by mouth daily for 10 days. 7 tablet 0     No current facility-administered medications for this visit.        Total Data Points: 4

## 2021-06-10 NOTE — TELEPHONE ENCOUNTER
Refill Approved    Rx renewed per Medication Renewal Policy. Medication was last renewed on 8/15/209.    Martha Tubbs, [facility] Connection Triage/Med Refill 8/17/2020     Requested Prescriptions   Pending Prescriptions Disp Refills     traZODone (DESYREL) 50 MG tablet [Pharmacy Med Name: TRAZODONE 50MG TABLETS] 90 tablet 0     Sig: TAKE 1 TABLET(50 MG) BY MOUTH AT BEDTIME       Tricyclics/Misc Antidepressant/Antianxiety Meds Refill Protocol Passed - 8/16/2020 10:03 AM        Passed - PCP or prescribing provider visit in last year     Last office visit with prescriber/PCP: 9/3/2019 Gera Mehta MD OR same dept: 11/27/2019 Stephenie Beth CNP OR same specialty: 11/27/2019 Stephenie Beth CNP  Last physical: Visit date not found Last MTM visit: Visit date not found   Next visit within 3 mo: Visit date not found  Next physical within 3 mo: Visit date not found  Prescriber OR PCP: Gera Mehta MD  Last diagnosis associated with med order: 1. Insomnia  - traZODone (DESYREL) 50 MG tablet [Pharmacy Med Name: TRAZODONE 50MG TABLETS]; TAKE 1 TABLET(50 MG) BY MOUTH AT BEDTIME  Dispense: 90 tablet; Refill: 0    If protocol passes may refill for 12 months if within 3 months of last provider visit (or a total of 15 months).

## 2021-06-12 NOTE — PROGRESS NOTES
SUBJECTIVE:   Chief Complaint   Patient presents with     URI     Coughing, wheezing, shortness of breath - finished course of Prednisone      Allen Grubbs 76 y.o. male    Current Outpatient Prescriptions   Medication Sig Dispense Refill     albuterol (PROVENTIL) 2.5 mg /3 mL (0.083 %) nebulizer solution Take 3 mL (2.5 mg total) by nebulization every 6 (six) hours as needed for wheezing. 75 mL 1     codeine-guaiFENesin (GUAIFENESIN AC)  mg/5 mL liquid Take 5 mL by mouth 2 (two) times a day as needed for cough. 240 mL 0     lovastatin (MEVACOR) 40 MG tablet Take 1 tablet (40 mg total) by mouth bedtime. 90 tablet 3     lovastatin (MEVACOR) 40 MG tablet TAKE 1 TABLET BY MOUTH EVERY DAY 90 tablet 1     nebulizers Misc Use as directed: q 6 hrs PRN. 1 each 0     sildenafil (VIAGRA) 100 MG tablet Take 100 mg by mouth daily as needed for erectile dysfunction. 1 hour before needed.       traZODone (DESYREL) 50 MG tablet Take 1 tablet (50 mg total) by mouth bedtime. 180 tablet 1     fluticasone (FLOVENT HFA) 110 mcg/actuation inhaler Inhale 1 puff 2 (two) times a day. 1 Inhaler 0     predniSONE (DELTASONE) 20 MG tablet Take 1 tablet (20 mg total) by mouth daily. 7 tablet 0     No current facility-administered medications for this visit.      Allergies: Review of patient's allergies indicates no known allergies.   No LMP for male patient.    HPI:   Patient is following up for recurring coughing problems and some shortness of breath.  He does have a history on his chart of COPD but is not recall ever being told that he had this.  He recently completed a course of prednisone which he noted that he felt great while on the prednisone his breathing was improved and he had no coughing shortly after completion of the course of prednisone his cough has returned.  He still feels that he is not lacking in endurance he can do things actively and not get markedly out of breath but he continues to have coughing throughout the day  and night.  Discussed with this could be symptom of uncontrolled COPD but he is worried about the possibility of bronchitis or pneumonia and wanting antibiotics-we did obtain chest x-ray today showing no signs of pneumonia and we discussed this with blood work with him today.  We did discuss with him another course of prednisone as we start him on a inhaler to help control his coughing and improve his slight amount of breathing abnormalities.  We will start him on Flovent 1 puff twice daily and another burst of prednisone at this time.    ROS: negative except as per HPI    OBJECTIVE:   The patient appears well, alert, oriented x 3, in no distress.  /74 (Patient Site: Left Arm, Patient Position: Sitting, Cuff Size: Adult Regular)  Pulse 78  Temp 98.9  F (37.2  C) (Oral)   Resp 24  Wt 202 lb (91.6 kg)  BMI 29.83 kg/m2    Lungs: Good air movement with mild rhonchi heard at the bases no wheezing  Cardiac: S1 and S2 normal, no murmurs, regular rate and rhythm.   Abdomen: normal bowel sounds, soft   Extremities: show no edema, normal peripheral pulses.   Neurological: normal, no focal findings.  Skin: clear, dry, no rashes/lesions  Psych- normal mood and affect      ASSESSMENT/PLAN  1. Cough  Suspect uncontrolled COPD  Restart Flovent 1 puff twice daily  Another seven-day course of prednisone will be sent to his pharmacy as well as the inhaler starts taken to affect  Reviewed x-ray findings and lab work with him today  Patient will follow-up if symptoms are not improved  - HM1(CBC and Differential)  - XR Chest PA and Lateral; Future  - HM1 (CBC with Diff)      Pt states an understanding and agrees to the above plan.

## 2021-06-12 NOTE — PROGRESS NOTES
ASSESSMENT and PLAN:  1. AK (actinic keratosis)  Lesion was frozen off today with liquid nitrogen.  I gave him the opportunity to do a shave excision and sent for biopsy but he indicated he would come back if lesion does not heal.  Tolerated well.  See below for procedure note     2. Chronic obstructive pulmonary disease, unspecified COPD type (H)  Since quitting smoking he does not rely on inhalers.  No other symptoms consistent with COPD.  Long-term history of smoking.    3. Essential Hypercholesterolemia  Continue lovastatin 40 mg daily    4. Insomnia, unspecified type  Patient using trazodone with good benefit.  No take daytime grogginess.  - Comprehensive Metabolic Panel  - traZODone (DESYREL) 50 MG tablet; TAKE 1 TABLET(50 MG) BY MOUTH AT BEDTIME  Dispense: 90 tablet; Refill: 4             There are no Patient Instructions on file for this visit.    Orders Placed This Encounter   Procedures     Pneumococcal polysaccharide vaccine 23-valent 3 yo or older, subq/IM     Comprehensive Metabolic Panel     Lipid Rescue FASTING     Order Specific Question:   Fasting is required?     Answer:   Yes     Medications Discontinued During This Encounter   Medication Reason     gabapentin (NEURONTIN) 100 MG capsule      lovastatin (MEVACOR) 40 MG tablet Reorder     traZODone (DESYREL) 50 MG tablet Reorder     albuterol (PROVENTIL) 2.5 mg /3 mL (0.083 %) nebulizer solution      albuterol (VENTOLIN HFA) 90 mcg/actuation inhaler      fluticasone-salmeterol (ADVAIR HFA) 230-21 mcg/actuation inhaler        No follow-ups on file.    DATA REVIEWED:    The visit lasted a total of  20min  -5 minutes spent performing procedure minutes face to face with the patient. Over 50% of the time was spent counseling and educating the patient     CHIEF COMPLAINT:  Chief Complaint   Patient presents with     Mass     Growth on top of left knee       HISTORY OF PRESENT ILLNESS:  Allen Grubbs is a 80 y.o. male who presents today for medication  management and evaluation of an atypical skin lesion on his left knee that he notes to be present for many months.  He tends to scratch it off and it bleeds.  Has had a basal cell carcinoma in the past that has been removed    Used to be heavy smoker. Had pneumonia in January. Dx the following February again and quit on the spot . Walks daily.  Denies any shortness of breath or cardiac symptoms of angina.    He is no longer needing inhalers.    He continues to use lovastatin 40 mg for cholesterol.    Takes trazodone every night for sleep.  No daytime grogginess.    REVIEW OF SYSTEMS:    Comprehensive review of systems is negative except as noted in the HPI.     PFSH:  Tobacco use and medications reviewed below.     TOBACCO USE:  Social History     Tobacco Use   Smoking Status Former Smoker     Quit date: 2002     Years since quittin.1   Smokeless Tobacco Never Used   Tobacco Comment    quit apx 13yrs ago       VITALS:  Vitals:    10/12/20 1508   BP: 134/67   Pulse: 64   Resp: 24   Temp: 96.9  F (36.1  C)   TempSrc: Oral   SpO2: 96%   Weight: 189 lb 12.8 oz (86.1 kg)     Wt Readings from Last 3 Encounters:   10/12/20 189 lb 12.8 oz (86.1 kg)   19 205 lb (93 kg)   19 203 lb (92.1 kg)       PHYSICAL EXAM:  Constitutional:  Reveals a 80 y.o. male in NAD.  Vitals:  Per nursing notes.  Neck:  Supple, thyroid not palpable.  Cardiac:  Regular rate and rhythm without murmurs, rubs, or gallops. Carotids without bruits. Legs without edema. Palpation of the radial pulse regular.  Lungs: Clear.  Respiratory effort normal.  Skin:   Without rash, bruise, or palpable lesions.  Rheumatologic: Normal joints and nails of the hands.  Neurologic:  Cranial nerves II-XII intact.     Psychiatric:  Mood appropriate, memory intact.   Skin there is an erythematous lesion that is scaly on his left knee that appears consistent with actinic keratosis.         Cryotherapy Procedure Note    Pre-operative Diagnosis: Actinic  keratosis    Post-operative Diagnosis: Actinic keratosis    Locations: Left anterior knee   indications: Precancerous lesion     Procedure Details   History of allergy to iodine: no.  Pacemaker? no.    Patient informed of risks (permanent scarring, infection, light or dark discoloration, bleeding, infection, weakness, numbness and recurrence of the lesion) and benefits of the procedure and verbal informed consent obtained.    The areas are treated with liquid nitrogen therapy, frozen until ice ball extended 1 mm beyond lesion, allowed to thaw, and treated again. The patient tolerated procedure well.  The patient was instructed on post-op care, warned that there may be blister formation, redness and pain. Recommend OTC analgesia as needed for pain.    Condition:  Stable    Complications:  none.    Plan:  1. Instructed to keep the area dry and covered for 24-48h and clean thereafter.  2. Warning signs of infection were reviewed.    3. Recommended that the patient use OTC acetaminophen as needed for pain.   4. Return in 4 weeks.    MEDICATIONS:  Current Outpatient Medications   Medication Sig Dispense Refill     lovastatin (MEVACOR) 40 MG tablet Take 1 tablet (40 mg total) by mouth at bedtime. 90 tablet 3     nebulizers Misc Use as directed: q 6 hrs PRN. 1 each 0     sildenafil (VIAGRA) 100 MG tablet Take 100 mg by mouth daily as needed for erectile dysfunction. 1 hour before needed.       traZODone (DESYREL) 50 MG tablet TAKE 1 TABLET(50 MG) BY MOUTH AT BEDTIME 90 tablet 4     No current facility-administered medications for this visit.

## 2021-06-13 NOTE — TELEPHONE ENCOUNTER
Last Office Visit:  10/12/2020  Last Refill:  10/12/2020  CSA:  No  Date of Last Labs:  10/12/2020    Requested Prescriptions     Pending Prescriptions Disp Refills     traZODone (DESYREL) 50 MG tablet 90 tablet 4     Sig: TAKE 1 TABLET(50 MG) BY MOUTH AT BEDTIME

## 2021-06-15 NOTE — PROGRESS NOTES
ASSESSMENT/PLAN  1. Hyperlipidemia  Patient is here for fasting labs today we will refill medications  Check labs and follow-up based on results  - lovastatin (MEVACOR) 40 MG tablet; Take 1 tablet (40 mg total) by mouth at bedtime.  Dispense: 90 tablet; Refill: 3    2. Health care maintenance  Patient has no acute concerns today  Is here for fasting labs  He like to be updated with tetanus  - Comprehensive Metabolic Panel  - HM2(CBC w/o Differential)  - Lipid Cocke FASTING        SUBJECTIVE:   Chief Complaint   Patient presents with     Follow-up     follow up on previous visits     Allen Simond 77 y.o. male    Current Outpatient Prescriptions   Medication Sig Dispense Refill     albuterol (PROVENTIL) 2.5 mg /3 mL (0.083 %) nebulizer solution Take 3 mL (2.5 mg total) by nebulization every 6 (six) hours as needed for wheezing. 75 mL 1     fluticasone (FLOVENT HFA) 110 mcg/actuation inhaler Inhale 1 puff 2 (two) times a day. 1 Inhaler 0     lovastatin (MEVACOR) 40 MG tablet Take 1 tablet (40 mg total) by mouth at bedtime. 90 tablet 3     traZODone (DESYREL) 50 MG tablet Take 1 tablet (50 mg total) by mouth at bedtime. 90 tablet 3     nebulizers Misc Use as directed: q 6 hrs PRN. 1 each 0     sildenafil (VIAGRA) 100 MG tablet Take 100 mg by mouth daily as needed for erectile dysfunction. 1 hour before needed.       No current facility-administered medications for this visit.      Allergies: Review of patient's allergies indicates no known allergies.   No LMP for male patient.    HPI:   Patient is here for fasting labs and update on immunizations  He has no acute concerns today  He is doing well on current medications will send refills of his statin medication  Is doing well with his inhalers with no acute concerns  His trazodone is working well for him at night  He started keep active at this time  Blood pressures at goal range  We will update with vaccines today    ROS: negative except as per HPI    OBJECTIVE:    The patient appears well, alert, oriented x 3, in no distress.  /70 (Patient Site: Right Arm, Patient Position: Sitting, Cuff Size: Adult Regular)  Pulse 68  Temp 97.7  F (36.5  C) (Oral)   Resp 20  Wt 206 lb 6.4 oz (93.6 kg)  BMI 30.48 kg/m2    Lungs: clear, good air entry, no wheezes, rhonchi or rales.   Cardiac: S1 and S2 normal, no murmurs, regular rate and rhythm.   Extremities: show no edema, normal peripheral pulses.   Skin: clear, dry, no rashes/lesions  Psych- normal mood and affect      Pt states an understanding and agrees to the above plan.

## 2021-06-15 NOTE — PROGRESS NOTES
ASSESSMENT/PLAN  1. Hydrocele  Patient presents for follow-up regards to hydrocele  As noted last year and ultrasound imaging confirmed hydrocele bilaterally patient scrotum  Patient is been monitoring the area with no changes  He did read an article recently concerning for possibility of testicular cancer which we discussed with him today reassurance given continue to monitor at this time        SUBJECTIVE:   Chief Complaint   Patient presents with     Mass     Check lump on right testicle      Allen Grubbs 77 y.o. male    Current Outpatient Prescriptions   Medication Sig Dispense Refill     albuterol (PROVENTIL) 2.5 mg /3 mL (0.083 %) nebulizer solution Take 3 mL (2.5 mg total) by nebulization every 6 (six) hours as needed for wheezing. 75 mL 1     fluticasone (FLOVENT HFA) 110 mcg/actuation inhaler Inhale 1 puff 2 (two) times a day. 1 Inhaler 0     lovastatin (MEVACOR) 40 MG tablet TAKE 1 TABLET BY MOUTH EVERY DAY 90 tablet 1     nebulizers Misc Use as directed: q 6 hrs PRN. 1 each 0     sildenafil (VIAGRA) 100 MG tablet Take 100 mg by mouth daily as needed for erectile dysfunction. 1 hour before needed.       traZODone (DESYREL) 50 MG tablet Take 1 tablet (50 mg total) by mouth bedtime. 180 tablet 1     No current facility-administered medications for this visit.      Allergies: Review of patient's allergies indicates no known allergies.   No LMP for male patient.    HPI:   Patient is here for follow-up regards to hydrocele  Was seen roughly a year and half ago for concerns over a bump the phone in his scrotum  He read a recent article concerning for testicular cancer reviewed with him last year the mass was evaluated with ultrasound showing to be a hydrocele  He states it has not been changing all in size or shape no resident been given him any new symptoms  Reassurance given that this is a hydrocele no follow-up needed in regards to this at this time  He will continue to monitor    ROS: negative except as  per HPI    OBJECTIVE:   The patient appears well, alert, oriented x 3, in no distress.  /72 (Patient Site: Left Arm, Patient Position: Sitting, Cuff Size: Adult Large)  Pulse 66  Temp 97.7  F (36.5  C) (Oral)   Resp 20  Wt 209 lb (94.8 kg)  BMI 30.86 kg/m2    Lungs: clear, good air entry, no wheezes, rhonchi or rales.   Cardiac: S1 and S2 normal, no murmurs, regular rate and rhythm.    Extremities: show no edema, normal peripheral pulses.   Psych- normal mood and affect      Pt states an understanding and agrees to the above plan.
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
## 2021-06-16 NOTE — PROGRESS NOTES
Called patient and was able to reach him. Let patient know that CXR looks normal per radiology. However I advised patient to keep taking Levaquin and also keep taking prednisone.  Tells me that he is feeling better.  Also advised him to keep his appointment with Dr. Mehta on 4/10/2018.  Patient verbalized understanding and has no further questions.

## 2021-06-17 NOTE — PROGRESS NOTES
ASSESSMENT/PLAN  1. COPD (chronic obstructive pulmonary disease)  Patient is day-to-day symptoms of COPD not under ideal control  He was seen recently by one my partners and reviewed her notes from both visits in March  Reviewed x-ray findings with the patient today  He did feel better during time course is why he was on prednisone  Discussed with him not a long-term solution but we need to get a better controlling inhaler  We will start him on Advair 2 puffs twice daily he will contact me if his insurance is not covering well  He will contact me in a couple weeks let me know the change in his symptoms in the meantime will give him another 5 day burst of prednisone as he feels his airways are quite tight and not been improved on his current dosing of albuterol and Flovent        SUBJECTIVE:   Chief Complaint   Patient presents with     Nasal Congestion     Pt would like to recheck congestion, was seen by Dr Steiner 3/26/18- was given Prednisone, Nebulizer didn't seem to help     Allen Grubbs 77 y.o. male    Current Outpatient Prescriptions   Medication Sig Dispense Refill     albuterol (PROVENTIL) 2.5 mg /3 mL (0.083 %) nebulizer solution Take 3 mL (2.5 mg total) by nebulization every 6 (six) hours as needed for wheezing. 75 mL 1     albuterol (VENTOLIN HFA) 90 mcg/actuation inhaler Inhale 2 puffs every 4 (four) hours as needed for wheezing (J44.9). 1 Inhaler 0     lovastatin (MEVACOR) 40 MG tablet Take 1 tablet (40 mg total) by mouth at bedtime. 90 tablet 3     nebulizers Misc Use as directed: q 6 hrs PRN. 1 each 0     sildenafil (VIAGRA) 100 MG tablet Take 100 mg by mouth daily as needed for erectile dysfunction. 1 hour before needed.       traZODone (DESYREL) 50 MG tablet Take 1 tablet (50 mg total) by mouth at bedtime. 90 tablet 3     fluticasone-salmeterol (ADVAIR HFA) 230-21 mcg/actuation inhaler Inhale 2 puffs 2 (two) times a day. 1 Inhaler 1     predniSONE (DELTASONE) 20 MG tablet Take 2 tablets (40 mg  total) by mouth daily. 10 tablet 0     No current facility-administered medications for this visit.      Allergies: Review of patient's allergies indicates no known allergies.   No LMP for male patient.    HPI:   Patient is here to discuss ongoing problems with congestion cough and breathing.  Patient has known COPD.  He is currently been on Flovent in the past but is been seen in the last 4 weeks twice by 1 of my partners both for COPD related symptoms.  Reviewed her notes today and x-ray imaging obtained a couple weeks ago.  Discussed with patient his symptoms are consistent with uncontrolled COPD.  He has noted more shortness of breath with his regulatory ambulation but having no chest pain.  He noticed himself to be more short of breath and coughing at night.  No worsening lower extremity edema I continue to try to exercise on a regular basis but just feels his breathing is not under adequate control  Discussed with him I think the Flovent does not provide him adequate coverage and would like to increase the inhaler to a level of higher steroid longer acting and better adrenergic control  We will start Advair 2 puffs twice daily  He will contact me if his insurance does not cover this well  We will start him on 40 mg of prednisone ×5 days here to help as we start new inhaler  He has no other symptoms that make me concerned about cardiac etiology at this point or viral or bacterial illness that would warrant antibiotic coverage at this time    ROS: negative except as per HPI    OBJECTIVE:   The patient appears well, alert, oriented x 3, in no distress.  /80 (Patient Site: Left Arm, Patient Position: Sitting, Cuff Size: Adult Regular)  Pulse 70  Temp 98.1  F (36.7  C) (Oral)   Resp 18  Wt 208 lb 3.2 oz (94.4 kg)  SpO2 94%  BMI 30.75 kg/m2    Lungs: Bilateral wheezing heard in the lung fields both apices and bases with no rhonchi noted  Cardiac: S1 and S2 normal, no murmurs, regular rate and rhythm.    Abdomen: normal bowel sounds, soft without tenderness, guarding, mass or organomegaly.   Extremities: show no edema, normal peripheral pulses.   Neurological: normal, no focal findings.  Skin: clear, dry, no rashes/lesions  Psych- normal mood and affect      Pt states an understanding and agrees to the above plan.  Greater than 25 minutes was spent today in interview and examination with Allen Grubbs with more than 50% of that time in counseling and coordination of care.

## 2021-06-19 NOTE — LETTER
Letter by Stephenie Beth CNP at      Author: Stephenie Beth CNP Service: -- Author Type: --    Filed:  Encounter Date: 12/5/2019 Status: Signed         Allen Grubbs  [address] [facility] [address]  [place] [facility] [address]             December 5, 2019         Dear Mr. Grubbs,    Below are the results from your recent visit:    Resulted Orders   XR Lumbar Spine 2 or 3 VWS    Narrative    EXAM: XR LUMBAR SPINE 2 OR 3 VWS  LOCATION: [facility]  DATE/TIME: 11/27/2019 3:27 PM    INDICATION: Lumbago with sciatica, left side.  COMPARISON: None.      Impression    There are 5 lumbar type vertebral bodies. Dextroscoliosis centered at L1-L2. Mild anterior spondylolisthesis at L5-S1 by 5 mm. Otherwise normal alignment. Normal vertebral body heights without discrete compression fracture. Severe disc space   narrowing L3-L5 with advanced endplate sclerosis and marginal osteophytes. Moderate disc space narrowing L1-L2, L2-L3, and L5-S1. At least moderate facet arthropathy L3-S1. The sacrum and visualized pelvis are unremarkable.       You have severe disc space narrowing at L3-L5 which can be painful in lower back. You also have moderate disc space narrowing of the L1-L2, L2-3, and L5-S1.  Some moderate arthritis between L3-S1. All of this can be causing your pain. Please follow with the [facility] for treatment options.     Please call with questions or contact us using [employer].    Sincerely,        Electronically signed by Stephenie Beth CNP

## 2021-06-19 NOTE — LETTER
Letter by Gera Mehta MD at      Author: Gera Mehta MD Service: -- Author Type: --    Filed:  Encounter Date: 9/10/2019 Status: (Other)         Allen Grubbs  [address] [facility] [address]             September 10, 2019         Dear Mr. Grubbs,    Below are the results from your recent visit:    Resulted Orders   HM2(CBC w/o Differential)   Result Value Ref Range    WBC 7.1 4.0 - 11.0 thou/uL    RBC 4.91 4.40 - 6.20 mill/uL    Hemoglobin 14.3 14.0 - 18.0 g/dL    Hematocrit 42.6 40.0 - 54.0 %    MCV 87 80 - 100 fL    MCH 29.2 27.0 - 34.0 pg    MCHC 33.6 32.0 - 36.0 g/dL    RDW 12.4 11.0 - 14.5 %    Platelets 221 140 - 440 thou/uL    MPV 7.8 7.0 - 10.0 fL   Comprehensive Metabolic Panel   Result Value Ref Range    Sodium 140 136 - 145 mmol/L    Potassium 4.1 3.5 - 5.0 mmol/L    Chloride 106 98 - 107 mmol/L    CO2 26 22 - 31 mmol/L    Anion Gap, Calculation 8 5 - 18 mmol/L    Glucose 83 70 - 125 mg/dL    BUN 19 8 - 28 mg/dL    Creatinine 1.18 0.70 - 1.30 mg/dL    GFR MDRD Af Amer >60 >60 mL/min/1.73m2    GFR MDRD Non Af Amer 60 (L) >60 mL/min/1.73m2    Bilirubin, Total 0.6 0.0 - 1.0 mg/dL    Calcium 9.4 8.5 - 10.5 mg/dL    Protein, Total 6.3 6.0 - 8.0 g/dL    Albumin 3.8 3.5 - 5.0 g/dL    Alkaline Phosphatase 72 45 - 120 U/L    AST 19 0 - 40 U/L    ALT 13 0 - 45 U/L    Narrative    Fasting Glucose reference range is 70-99 mg/dL per  American Diabetes Association (ADA) guidelines.   Lipid Bradford FASTING   Result Value Ref Range    Cholesterol 167 <=199 mg/dL    Triglycerides 60 <=149 mg/dL    HDL Cholesterol 66 >=40 mg/dL    LDL Calculated 89 <=129 mg/dL    Patient Fasting > 8hrs? Yes        Kidney and liver function are normal with no signs of diabetes.  Cholesterol levels are at goal range.  No concerns on blood work.    Please call with questions or contact us using [employer].    Sincerely,        Electronically signed by Gera Mehta MD

## 2021-06-20 NOTE — LETTER
Letter by Aleta Jama DO at      Author: Aleta Jama DO Service: -- Author Type: --    Filed:  Encounter Date: 10/13/2020 Status: (Other)         Allen Grubbs  [address]  [facility] [address]             October 13, 2020         Dear Mr. Grubbs,    Below are the results from your recent visit:    Resulted Orders   Comprehensive Metabolic Panel   Result Value Ref Range    Sodium 139 136 - 145 mmol/L    Potassium 4.0 3.5 - 5.0 mmol/L    Chloride 104 98 - 107 mmol/L    CO2 28 22 - 31 mmol/L    Anion Gap, Calculation 7 5 - 18 mmol/L    Glucose 91 70 - 125 mg/dL    BUN 11 8 - 28 mg/dL    Creatinine 0.87 0.70 - 1.30 mg/dL    GFR MDRD Af Amer >60 >60 mL/min/1.73m2    GFR MDRD Non Af Amer >60 >60 mL/min/1.73m2    Bilirubin, Total 0.7 0.0 - 1.0 mg/dL    Calcium 8.9 8.5 - 10.5 mg/dL    Protein, Total 6.2 6.0 - 8.0 g/dL    Albumin 3.7 3.5 - 5.0 g/dL    Alkaline Phosphatase 77 45 - 120 U/L    AST 25 0 - 40 U/L    ALT 19 0 - 45 U/L    Narrative    Fasting Glucose reference range is 70-99 mg/dL per  American Diabetes Association (ADA) guidelines.   Lipid Daniels FASTING   Result Value Ref Range    Cholesterol 181 <=199 mg/dL    Triglycerides 81 <=149 mg/dL    HDL Cholesterol 71 >=40 mg/dL    LDL Calculated 94 <=129 mg/dL    Patient Fasting > 8hrs? No        All labs normal for kidney function, liver function, blood sugar and cholesterol.    Please call with questions or contact us using [employer].    Sincerely,        Electronically signed by Aleta Jama DO

## 2021-06-23 NOTE — TELEPHONE ENCOUNTER
Refill Approved    Rx renewed per Medication Renewal Policy. Medication was last renewed on 1/10/18    Darren Duong, [facility] Triage/Med Refill 2/9/2019     Requested Prescriptions   Pending Prescriptions Disp Refills     lovastatin (MEVACOR) 40 MG tablet [Pharmacy Med Name: LOVASTATIN 40MG TABLETS] 90 tablet 0     Sig: TAKE 1 TABLET(40 MG) BY MOUTH AT BEDTIME    Statins Refill Protocol (Hmg CoA Reductase Inhibitors) Passed - 2/7/2019  3:20 AM       Passed - PCP or prescribing provider visit in past 12 months     Last office visit with prescriber/PCP: 4/10/2018 Gera Mehta MD OR same dept: 4/10/2018 Gera Mehta MD OR same specialty: 4/10/2018 Gera Mehta MD  Last physical: Visit date not found Last MTM visit: Visit date not found   Next visit within 3 mo: Visit date not found  Next physical within 3 mo: Visit date not found  Prescriber OR PCP: Gera Mehta MD  Last diagnosis associated with med order: 1. Hyperlipidemia  - lovastatin (MEVACOR) 40 MG tablet [Pharmacy Med Name: LOVASTATIN 40MG TABLETS]; TAKE 1 TABLET(40 MG) BY MOUTH AT BEDTIME  Dispense: 90 tablet; Refill: 0    If protocol passes may refill for 12 months if within 3 months of last provider visit (or a total of 15 months).             traZODone (DESYREL) 50 MG tablet [Pharmacy Med Name: TRAZODONE 50MG TABLETS] 90 tablet 0     Sig: TAKE 1 TABLET(50 MG) BY MOUTH AT BEDTIME    Tricyclics/Misc Antidepressant/Antianxiety Meds Refill Protocol Passed - 2/7/2019  3:20 AM       Passed - PCP or prescribing provider visit in last year    Last office visit with prescriber/PCP: 4/10/2018 Gera Mehta MD OR same dept: 4/10/2018 Gera Mehta MD OR same specialty: 4/10/2018 Gera Mehta MD  Last physical: Visit date not found Last MTM visit: Visit date not found   Next visit within 3 mo: Visit date not found  Next physical within 3 mo: Visit date not found  Prescriber OR PCP: Gera Herman  MD Tyson  Last diagnosis associated with med order: 1. Hyperlipidemia  - lovastatin (MEVACOR) 40 MG tablet [Pharmacy Med Name: LOVASTATIN 40MG TABLETS]; TAKE 1 TABLET(40 MG) BY MOUTH AT BEDTIME  Dispense: 90 tablet; Refill: 0    If protocol passes may refill for 12 months if within 3 months of last provider visit (or a total of 15 months).

## 2021-08-23 ENCOUNTER — TRANSFERRED RECORDS (OUTPATIENT)
Dept: HEALTH INFORMATION MANAGEMENT | Facility: CLINIC | Age: 81
End: 2021-08-23

## 2021-09-24 ENCOUNTER — TRANSFERRED RECORDS (OUTPATIENT)
Dept: HEALTH INFORMATION MANAGEMENT | Facility: CLINIC | Age: 81
End: 2021-09-24

## 2021-11-22 ENCOUNTER — TELEPHONE (OUTPATIENT)
Dept: FAMILY MEDICINE | Facility: CLINIC | Age: 81
End: 2021-11-22
Payer: COMMERCIAL

## 2021-11-22 DIAGNOSIS — E78.00 PURE HYPERCHOLESTEROLEMIA: Primary | ICD-10-CM

## 2021-11-22 DIAGNOSIS — E78.5 HYPERLIPIDEMIA: ICD-10-CM

## 2021-11-22 NOTE — TELEPHONE ENCOUNTER
Patient waiting for his prescription.  He called on 11/19 and still hasnt received it from the pharmacy.    lovastatin (MEVACOR) 40 MG tablet    Please call patient with questions  Ok to leave a detailed message

## 2021-11-23 RX ORDER — LOVASTATIN 40 MG
40 TABLET ORAL AT BEDTIME
Qty: 90 TABLET | Refills: 0 | Status: SHIPPED | OUTPATIENT
Start: 2021-11-23 | End: 2022-01-04

## 2021-12-20 ENCOUNTER — TRANSFERRED RECORDS (OUTPATIENT)
Dept: HEALTH INFORMATION MANAGEMENT | Facility: CLINIC | Age: 81
End: 2021-12-20
Payer: COMMERCIAL

## 2022-01-04 ENCOUNTER — OFFICE VISIT (OUTPATIENT)
Dept: FAMILY MEDICINE | Facility: CLINIC | Age: 82
End: 2022-01-04
Payer: COMMERCIAL

## 2022-01-04 VITALS
HEART RATE: 70 BPM | HEIGHT: 66 IN | WEIGHT: 191.25 LBS | BODY MASS INDEX: 30.74 KG/M2 | DIASTOLIC BLOOD PRESSURE: 87 MMHG | OXYGEN SATURATION: 97 % | SYSTOLIC BLOOD PRESSURE: 137 MMHG | RESPIRATION RATE: 20 BRPM

## 2022-01-04 DIAGNOSIS — Z00.00 ENCOUNTER FOR MEDICARE ANNUAL WELLNESS EXAM: Primary | ICD-10-CM

## 2022-01-04 DIAGNOSIS — R31.9 HEMATURIA, UNSPECIFIED TYPE: ICD-10-CM

## 2022-01-04 DIAGNOSIS — J44.9 CHRONIC OBSTRUCTIVE PULMONARY DISEASE, UNSPECIFIED COPD TYPE (H): ICD-10-CM

## 2022-01-04 DIAGNOSIS — Z23 NEED FOR PROPHYLACTIC VACCINATION AND INOCULATION AGAINST INFLUENZA: ICD-10-CM

## 2022-01-04 DIAGNOSIS — E66.811 CLASS 1 OBESITY DUE TO EXCESS CALORIES WITH SERIOUS COMORBIDITY AND BODY MASS INDEX (BMI) OF 30.0 TO 30.9 IN ADULT: ICD-10-CM

## 2022-01-04 DIAGNOSIS — E78.00 PURE HYPERCHOLESTEROLEMIA: ICD-10-CM

## 2022-01-04 DIAGNOSIS — E66.09 CLASS 1 OBESITY DUE TO EXCESS CALORIES WITH SERIOUS COMORBIDITY AND BODY MASS INDEX (BMI) OF 30.0 TO 30.9 IN ADULT: ICD-10-CM

## 2022-01-04 DIAGNOSIS — G47.20 CIRCADIAN RHYTHM SLEEP DISORDER: ICD-10-CM

## 2022-01-04 PROBLEM — D49.2 SKIN NEOPLASM: Status: RESOLVED | Noted: 2022-01-04 | Resolved: 2022-01-04

## 2022-01-04 PROBLEM — D49.2 SKIN NEOPLASM: Status: ACTIVE | Noted: 2022-01-04

## 2022-01-04 LAB
ALBUMIN SERPL-MCNC: 3.5 G/DL (ref 3.5–5)
ALBUMIN UR-MCNC: ABNORMAL MG/DL
ALP SERPL-CCNC: 76 U/L (ref 45–120)
ALT SERPL W P-5'-P-CCNC: 15 U/L (ref 0–45)
ANION GAP SERPL CALCULATED.3IONS-SCNC: 8 MMOL/L (ref 5–18)
APPEARANCE UR: CLEAR
AST SERPL W P-5'-P-CCNC: 17 U/L (ref 0–40)
BACTERIA #/AREA URNS HPF: ABNORMAL /HPF
BILIRUB SERPL-MCNC: 0.5 MG/DL (ref 0–1)
BILIRUB UR QL STRIP: NEGATIVE
BUN SERPL-MCNC: 11 MG/DL (ref 8–28)
CALCIUM SERPL-MCNC: 9.4 MG/DL (ref 8.5–10.5)
CHLORIDE BLD-SCNC: 105 MMOL/L (ref 98–107)
CHOLEST SERPL-MCNC: 161 MG/DL
CO2 SERPL-SCNC: 27 MMOL/L (ref 22–31)
COLOR UR AUTO: YELLOW
CREAT SERPL-MCNC: 1.06 MG/DL (ref 0.7–1.3)
FASTING STATUS PATIENT QL REPORTED: YES
GFR SERPL CREATININE-BSD FRML MDRD: 70 ML/MIN/1.73M2
GLUCOSE BLD-MCNC: 92 MG/DL (ref 70–125)
GLUCOSE UR STRIP-MCNC: NEGATIVE MG/DL
HDLC SERPL-MCNC: 65 MG/DL
HGB UR QL STRIP: ABNORMAL
KETONES UR STRIP-MCNC: NEGATIVE MG/DL
LDLC SERPL CALC-MCNC: 82 MG/DL
LEUKOCYTE ESTERASE UR QL STRIP: NEGATIVE
MUCOUS THREADS #/AREA URNS LPF: PRESENT /LPF
NITRATE UR QL: NEGATIVE
PH UR STRIP: 6.5 [PH] (ref 5–8)
POTASSIUM BLD-SCNC: 4.3 MMOL/L (ref 3.5–5)
PROT SERPL-MCNC: 6.4 G/DL (ref 6–8)
RBC #/AREA URNS AUTO: ABNORMAL /HPF
SODIUM SERPL-SCNC: 140 MMOL/L (ref 136–145)
SP GR UR STRIP: 1.02 (ref 1–1.03)
SQUAMOUS #/AREA URNS AUTO: ABNORMAL /LPF
TRIGL SERPL-MCNC: 68 MG/DL
UROBILINOGEN UR STRIP-ACNC: 1 E.U./DL
WBC #/AREA URNS AUTO: ABNORMAL /HPF

## 2022-01-04 PROCEDURE — 81001 URINALYSIS AUTO W/SCOPE: CPT | Performed by: FAMILY MEDICINE

## 2022-01-04 PROCEDURE — 80061 LIPID PANEL: CPT | Performed by: FAMILY MEDICINE

## 2022-01-04 PROCEDURE — 99214 OFFICE O/P EST MOD 30 MIN: CPT | Mod: 25 | Performed by: FAMILY MEDICINE

## 2022-01-04 PROCEDURE — 90662 IIV NO PRSV INCREASED AG IM: CPT | Performed by: FAMILY MEDICINE

## 2022-01-04 PROCEDURE — G0008 ADMIN INFLUENZA VIRUS VAC: HCPCS | Performed by: FAMILY MEDICINE

## 2022-01-04 PROCEDURE — 80053 COMPREHEN METABOLIC PANEL: CPT | Performed by: FAMILY MEDICINE

## 2022-01-04 PROCEDURE — 36415 COLL VENOUS BLD VENIPUNCTURE: CPT | Performed by: FAMILY MEDICINE

## 2022-01-04 PROCEDURE — 99397 PER PM REEVAL EST PAT 65+ YR: CPT | Mod: 25 | Performed by: FAMILY MEDICINE

## 2022-01-04 ASSESSMENT — MIFFLIN-ST. JEOR: SCORE: 1510.25

## 2022-01-04 ASSESSMENT — ACTIVITIES OF DAILY LIVING (ADL): CURRENT_FUNCTION: NO ASSISTANCE NEEDED
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
## 2022-01-04 NOTE — PATIENT INSTRUCTIONS
Patient Education   Personalized Prevention Plan  You are due for the preventive services outlined below.  Your care team is available to assist you in scheduling these services.  If you have already completed any of these items, please share that information with your care team to update in your medical record.  Health Maintenance Due   Topic Date Due     Breathing Capacity Test  Never done     ANNUAL REVIEW OF HM ORDERS  Never done     COPD Action Plan  Never done     Zoster (Shingles) Vaccine (2 of 3) 08/31/2015     FALL RISK ASSESSMENT  09/03/2020     Flu Vaccine (1) 09/01/2021       Understanding USDA MyPlate  The USDA has guidelines to help you make healthy food choices. These are called MyPlate. MyPlate shows the food groups that make up healthy meals using the image of a place setting. Before you eat, think about the healthiest choices for what to put on your plate or in your cup or bowl. To learn more about building a healthy plate, visit www.[employer]plate.gov.    The food groups    Fruits. Any fruit or 100% fruit juice counts as part of the Fruit Group. Fruits may be fresh, canned, frozen, or dried, and may be whole, cut-up, or pureed. Make 1/2 of your plate fruits and vegetables.    Vegetables. Any vegetable or 100% vegetable juice counts as a member of the Vegetable Group. Vegetables may be fresh, frozen, canned, or dried. They can be served raw or cooked and may be whole, cut-up, or mashed. Make 1/2 of your plate fruits and vegetables.    Grains. All foods made from grains are part of the Grains Group. These include wheat, rice, oats, cornmeal, and barley. Grains are often used to make foods such as bread, pasta, oatmeal, cereal, tortillas, and grits. Grains should be no more than 1/4 of your plate. At least half of your grains should be whole grains.    Protein. This group includes meat, poultry, seafood, beans and peas, eggs, processed soy products (such as tofu), nuts (including nut butters), and  seeds. Make protein choices no more than 1/4 of your plate. Meat and poultry choices should be lean or low fat.    Dairy. The Dairy Group includes all fluid milk products and foods made from milk that contain calcium, such as yogurt and cheese. (Foods that have little calcium, such as cream, butter, and cream cheese, are not part of this group.) Most dairy choices should be low-fat or fat-free.    Oils. Oils aren't a food group, but they do contain essential nutrients. However it's important to watch your intake of oils. These are fats that are liquid at room temperature. They include canola, corn, olive, soybean, vegetable, and sunflower oil. Foods that are mainly oil include mayonnaise, certain salad dressings, and soft margarines. You likely already get your daily oil allowance from the foods you eat.  Things to limit  Eating healthy also means limiting these things in your diet:       Salt (sodium). Many processed foods have a lot of sodium. To keep sodium intake down, eat fresh vegetables, meats, poultry, and seafood when possible. Purchase low-sodium, reduced-sodium, or no-salt-added food products at the store. And don't add salt to your meals at home. Instead, season them with herbs and spices such as dill, oregano, cumin, and paprika. Or try adding flavor with lemon or lime zest and juice.    Saturated fat. Saturated fats are most often found in animal products such as beef, pork, and chicken. They are often solid at room temperature, such as butter. To reduce your saturated fat intake, choose leaner cuts of meat and poultry. And try healthier cooking methods such as grilling, broiling, roasting, or baking. For a simple lower-fat swap, use plain nonfat yogurt instead of mayonnaise when making potato salad or macaroni salad.    Added sugars. These are sugars added to foods. They are in foods such as ice cream, candy, soda, fruit drinks, sports drinks, energy drinks, cookies, pastries, jams, and syrups. Cut  down on added sugars by sharing sweet treats with a family member or friend. You can also choose fruit for dessert, and drink water or other unsweetened beverages.     [employer] last reviewed this educational content on 6/1/2020 2000-2021 [employer]. All rights reserved. This information is not intended as a substitute for professional medical care. Always follow your healthcare professional's instructions.

## 2022-01-05 ENCOUNTER — TELEPHONE (OUTPATIENT)
Dept: FAMILY MEDICINE | Facility: CLINIC | Age: 82
End: 2022-01-05
Payer: COMMERCIAL

## 2022-01-05 NOTE — TELEPHONE ENCOUNTER
----- Message from Lilia Vera MD sent at 1/5/2022  3:38 PM CST -----  Please call patient:  The labs look good with the exception of continued blood in the urine.  As we discussed at the visit, we need to rule out a cancer of the urinary system with his history of smoking.  The next step in workup is a CT scan of his kidneys and bladder.  Please let me know if he is willing and I will order, thanks.  Cholesterol and kidney function are normal.  MANISHA Vera MD

## 2022-01-05 NOTE — TELEPHONE ENCOUNTER
I spoke with Grabiel.  He is willing to do the CT scan.  Please enter the order.  I told him one of us will call him with the contact information to schedule. Please route message back to your pool once entered. Thanks MJ!

## 2022-01-12 ENCOUNTER — HOSPITAL ENCOUNTER (OUTPATIENT)
Dept: CT IMAGING | Facility: HOSPITAL | Age: 82
Discharge: HOME | End: 2022-01-12
Attending: FAMILY MEDICINE | Admitting: FAMILY MEDICINE
Payer: COMMERCIAL

## 2022-01-12 DIAGNOSIS — R31.9 HEMATURIA, UNSPECIFIED TYPE: ICD-10-CM

## 2022-01-12 PROCEDURE — 74178 CT ABD&PLV WO CNTR FLWD CNTR: CPT

## 2022-01-12 PROCEDURE — 250N000011 HC RX IP 250 OP 636: Performed by: FAMILY MEDICINE

## 2022-01-12 RX ORDER — IOPAMIDOL 755 MG/ML
100 INJECTION, SOLUTION INTRAVASCULAR ONCE
Status: COMPLETED | OUTPATIENT
Start: 2022-01-12 | End: 2022-01-12

## 2022-01-12 RX ADMIN — IOPAMIDOL 100 ML: 755 INJECTION, SOLUTION INTRAVENOUS at 15:19

## 2022-01-13 ENCOUNTER — TELEPHONE (OUTPATIENT)
Dept: FAMILY MEDICINE | Facility: CLINIC | Age: 82
End: 2022-01-13
Payer: COMMERCIAL

## 2022-01-13 NOTE — TELEPHONE ENCOUNTER
Just an FYI -    Incoming call from patient stating that he was on hold for over an hour just trying to get to us. Patient is wondering about his CT results.     I relayed Dr. Vera's message on the CT scan to patient. He stated understanding and will wait for a call from [facility]. Pt stated he doesn't have any questions but his mother did passed from the similar area what he's going through right now.     Communicated to patient that we just worked on the referral this morning and usually they will call him to schedule. But I will also follow up with [facility] tmrw or Monday to see if I can help get him an appt. He stated understanding and will wait to hear form [facility] or from me.

## 2022-01-14 NOTE — TELEPHONE ENCOUNTER
Followed up with [facility], no appt scheduled yet, they will reach out to pt to schedule if we just entered in order/referral yesterday then it hasnt uploaded to create a chart on their end yet.     I will follow up again on Monday.

## 2022-01-14 NOTE — TELEPHONE ENCOUNTER
Patient calling again to say he thought he missed a phone call from us or the urology clinic.  I explained to patient that if he hasnt heard from the urology clinic by Monday to call us back and we would help him get an appointment.

## 2022-01-19 ENCOUNTER — TELEPHONE (OUTPATIENT)
Dept: FAMILY MEDICINE | Facility: CLINIC | Age: 82
End: 2022-01-19
Payer: COMMERCIAL

## 2022-01-19 NOTE — TELEPHONE ENCOUNTER
----- Message from Lilia Vera MD sent at 1/13/2022  6:47 AM CST -----  Please call patient:  The CT scan does seem to show some small masses near the right kidney.  I will still refer to Urology for further explanation of what these might be and what next steps are.  The remainder of the kidneys and bladder looked good, but they may want to still use a scope to see inside the bladder.  MANISHA Vera MD

## 2022-02-10 ENCOUNTER — TRANSFERRED RECORDS (OUTPATIENT)
Dept: HEALTH INFORMATION MANAGEMENT | Facility: CLINIC | Age: 82
End: 2022-02-10
Payer: COMMERCIAL

## 2022-02-14 DIAGNOSIS — G47.00 INSOMNIA: ICD-10-CM

## 2022-02-14 RX ORDER — TRAZODONE HYDROCHLORIDE 50 MG/1
TABLET, FILM COATED ORAL
Qty: 90 TABLET | Refills: 1 | Status: SHIPPED | OUTPATIENT
Start: 2022-02-14 | End: 2022-08-09

## 2022-02-28 ENCOUNTER — TELEPHONE (OUTPATIENT)
Dept: FAMILY MEDICINE | Facility: CLINIC | Age: 82
End: 2022-02-28
Payer: COMMERCIAL

## 2022-02-28 NOTE — TELEPHONE ENCOUNTER
Spoke to pt and relayed MD message. Told him results of biopsy are not back yet. Urology should follow up with him once they receive results

## 2022-02-28 NOTE — TELEPHONE ENCOUNTER
Reason for Call:  Request for results:    Name of test or procedure: kidney imaging    Date of test of procedure: 2/22/2022    Location of the test or procedure: [facility]    OK to leave the result message on voice mail or with a family member? YES    Phone number Patient can be reached at:  Home number on file [phone] (home)    Additional comments: pt calling to get results of kidney imaging- do not see in chart    Call taken on 2/28/2022 at 10:36 AM by Rekha Sanz

## 2022-02-28 NOTE — TELEPHONE ENCOUNTER
I am not sure if patient is still referring to the image that I ordered in January.  This showed a cluster of masses in the kidney.  He then followed up with urology per my recommendation in February who recommended a biopsy.  Let myself or patient's PCP know if he has further questions.

## 2022-03-16 ENCOUNTER — TELEPHONE (OUTPATIENT)
Dept: FAMILY MEDICINE | Facility: CLINIC | Age: 82
End: 2022-03-16
Payer: COMMERCIAL

## 2022-03-16 NOTE — TELEPHONE ENCOUNTER
Patient calling for results of kidney CT he had done in January.  He said he missed a call but doesn't know who was calling him.  Please call to advise patient and give results.

## 2022-03-25 ENCOUNTER — TRANSFERRED RECORDS (OUTPATIENT)
Dept: HEALTH INFORMATION MANAGEMENT | Facility: CLINIC | Age: 82
End: 2022-03-25
Payer: COMMERCIAL

## 2022-06-27 ENCOUNTER — TRANSFERRED RECORDS (OUTPATIENT)
Dept: HEALTH INFORMATION MANAGEMENT | Facility: CLINIC | Age: 82
End: 2022-06-27

## 2022-08-08 DIAGNOSIS — G47.00 INSOMNIA: ICD-10-CM

## 2022-08-09 RX ORDER — TRAZODONE HYDROCHLORIDE 50 MG/1
TABLET, FILM COATED ORAL
Qty: 90 TABLET | Refills: 1 | Status: SHIPPED | OUTPATIENT
Start: 2022-08-09 | End: 2023-02-01

## 2022-08-09 NOTE — TELEPHONE ENCOUNTER
"Former patient of ??? & has not established care with another provider.  Please assign refill request to covering provider per clinic standard process.    Routing refill request to provider for review/approval because:  No PCP    Last Written Prescription Date:  2/14/22  Last Fill Quantity: 90,  # refills: 1   Last office visit provider:  1/4/22     Requested Prescriptions   Pending Prescriptions Disp Refills     traZODone (DESYREL) 50 MG tablet [Pharmacy Med Name: TRAZODONE 50MG TABLETS] 90 tablet 1     Sig: TAKE 1 TABLET(50 MG) BY MOUTH AT BEDTIME       Serotonin Modulators Passed - 8/9/2022  9:36 AM        Passed - Recent (12 mo) or future (30 days) visit within the authorizing provider's specialty     Patient has had an office visit with the authorizing provider or a provider within the authorizing providers department within the previous 12 mos or has a future within next 30 days. See \"Patient Info\" tab in inbasket, or \"Choose Columns\" in Meds & Orders section of the refill encounter.              Passed - Medication is active on med list        Passed - Patient is age 18 or older             Deion Lock RN 08/09/22 9:36 AM  "

## 2022-09-21 ENCOUNTER — TRANSFERRED RECORDS (OUTPATIENT)
Dept: HEALTH INFORMATION MANAGEMENT | Facility: CLINIC | Age: 82
End: 2022-09-21

## 2022-10-16 ENCOUNTER — HEALTH MAINTENANCE LETTER (OUTPATIENT)
Age: 82
End: 2022-10-16

## 2023-01-31 DIAGNOSIS — E78.5 HYPERLIPIDEMIA: ICD-10-CM

## 2023-01-31 DIAGNOSIS — G47.00 INSOMNIA: ICD-10-CM

## 2023-02-01 NOTE — TELEPHONE ENCOUNTER
"Routing refill request to provider for review/approval because:  Labs not current:  LDL  Patient needs to be seen because it has been more than 1 year since last office visit.    Last Written Prescription Date:  8/9/22  Last Fill Quantity: 90,  # refills: 1   Last office visit provider:  1/4/22     Requested Prescriptions   Pending Prescriptions Disp Refills     traZODone (DESYREL) 50 MG tablet [Pharmacy Med Name: TRAZODONE 50MG TABLETS] 90 tablet 1     Sig: TAKE 1 TABLET(50 MG) BY MOUTH AT BEDTIME       Serotonin Modulators Failed - 2/1/2023 10:43 AM        Failed - Recent (12 mo) or future (30 days) visit within the authorizing provider's specialty     Patient has had an office visit with the authorizing provider or a provider within the authorizing providers department within the previous 12 mos or has a future within next 30 days. See \"Patient Info\" tab in inbasket, or \"Choose Columns\" in Meds & Orders section of the refill encounter.              Passed - Medication is active on med list        Passed - Patient is age 18 or older           lovastatin (MEVACOR) 40 MG tablet [Pharmacy Med Name: LOVASTATIN 40MG TABLETS] 90 tablet 1     Sig: TAKE 1 TABLET(40 MG) BY MOUTH AT BEDTIME       Statins Protocol Failed - 1/31/2023 10:06 AM        Failed - LDL on file in past 12 months     Recent Labs   Lab Test 01/04/22  1615   LDL 82             Failed - Recent (12 mo) or future (30 days) visit within the authorizing provider's specialty     Patient has had an office visit with the authorizing provider or a provider within the authorizing providers department within the previous 12 mos or has a future within next 30 days. See \"Patient Info\" tab in inbasket, or \"Choose Columns\" in Meds & Orders section of the refill encounter.              Passed - No abnormal creatine kinase in past 12 months     No lab results found.             Passed - Medication is active on med list        Passed - Patient is age 18 or older       "       Deion Lock RN 02/01/23 10:43 AM

## 2023-02-01 NOTE — TELEPHONE ENCOUNTER
If patient plans to continue to see me, he needs to schedule an appointment and then I can bridge.

## 2023-02-02 RX ORDER — TRAZODONE HYDROCHLORIDE 50 MG/1
50 TABLET, FILM COATED ORAL AT BEDTIME
Qty: 90 TABLET | Refills: 0 | Status: SHIPPED | OUTPATIENT
Start: 2023-02-02 | End: 2023-05-23

## 2023-02-02 RX ORDER — LOVASTATIN 40 MG
40 TABLET ORAL AT BEDTIME
Qty: 90 TABLET | Refills: 0 | Status: SHIPPED | OUTPATIENT
Start: 2023-02-02 | End: 2023-05-23

## 2023-03-07 ENCOUNTER — TRANSFERRED RECORDS (OUTPATIENT)
Dept: HEALTH INFORMATION MANAGEMENT | Facility: CLINIC | Age: 83
End: 2023-03-07

## 2023-04-01 ENCOUNTER — HEALTH MAINTENANCE LETTER (OUTPATIENT)
Age: 83
End: 2023-04-01

## 2023-05-20 DIAGNOSIS — G47.00 INSOMNIA: ICD-10-CM

## 2023-05-20 DIAGNOSIS — E78.5 HYPERLIPIDEMIA: ICD-10-CM

## 2023-05-20 NOTE — TELEPHONE ENCOUNTER
"Routing refill request to provider for review/approval because:  Patient needs to be seen because it has been more than 1 year since last office visit. Labs not current.    Lovastatin  Last Written Prescription Date:  2/2/23  Last Fill Quantity: 90,  # refills: 0     Trazodone   Last Written Prescription Date:  2/2/23  Last Fill Quantity: 90,  # refills: 0     Last office visit provider:  1/4/22     Requested Prescriptions   Pending Prescriptions Disp Refills     traZODone (DESYREL) 50 MG tablet [Pharmacy Med Name: TRAZODONE 50MG TABLETS] 90 tablet 0     Sig: TAKE 1 TABLET(50 MG) BY MOUTH AT BEDTIME       Serotonin Modulators Failed - 5/20/2023  9:19 AM        Failed - Recent (12 mo) or future (30 days) visit within the authorizing provider's specialty     Patient has had an office visit with the authorizing provider or a provider within the authorizing providers department within the previous 12 mos or has a future within next 30 days. See \"Patient Info\" tab in [employer], or \"Choose Columns\" in Meds & Orders section of the refill encounter.              Passed - Medication is active on med list        Passed - Patient is age 18 or older           lovastatin (MEVACOR) 40 MG tablet [Pharmacy Med Name: LOVASTATIN 40MG TABLETS] 90 tablet 0     Sig: TAKE 1 TABLET(40 MG) BY MOUTH AT BEDTIME       Statins Protocol Failed - 5/20/2023  9:19 AM        Failed - LDL on file in past 12 months     Recent Labs   Lab Test 01/04/22  1615   LDL 82             Failed - Recent (12 mo) or future (30 days) visit within the authorizing provider's specialty     Patient has had an office visit with the authorizing provider or a provider within the authorizing providers department within the previous 12 mos or has a future within next 30 days. See \"Patient Info\" tab in [employer], or \"Choose Columns\" in Meds & Orders section of the refill encounter.              Passed - No abnormal creatine kinase in past 12 months     No lab results found. "             Passed - Medication is active on med list        Passed - Patient is age 18 or older             PHILLIP SUNSHINE, RN 05/20/23 3:50 PM

## 2023-05-22 RX ORDER — TRAZODONE HYDROCHLORIDE 50 MG/1
TABLET, FILM COATED ORAL
Qty: 90 TABLET | Refills: 0 | OUTPATIENT
Start: 2023-05-22

## 2023-05-22 RX ORDER — LOVASTATIN 40 MG
TABLET ORAL
Qty: 90 TABLET | Refills: 0 | OUTPATIENT
Start: 2023-05-22

## 2023-05-23 DIAGNOSIS — G47.00 INSOMNIA: ICD-10-CM

## 2023-05-23 DIAGNOSIS — E78.5 HYPERLIPIDEMIA: ICD-10-CM

## 2023-05-23 RX ORDER — LOVASTATIN 40 MG
40 TABLET ORAL AT BEDTIME
Qty: 90 TABLET | Refills: 0 | Status: SHIPPED | OUTPATIENT
Start: 2023-05-23 | End: 2023-08-15

## 2023-05-23 RX ORDER — TRAZODONE HYDROCHLORIDE 50 MG/1
50 TABLET, FILM COATED ORAL AT BEDTIME
Qty: 90 TABLET | Refills: 0 | Status: SHIPPED | OUTPATIENT
Start: 2023-05-23 | End: 2023-08-15

## 2023-05-23 NOTE — TELEPHONE ENCOUNTER
Pt scheduled 6/1/23 for med check with Dr. Vera. Pt is out of medications requesting refill to get him to appt. Please advise.

## 2023-05-23 NOTE — TELEPHONE ENCOUNTER
Patient scheduled an appointment for march and then no-showed.  No Rx until appt. Rescheduled, unless he is being seen elsewhere

## 2023-05-24 RX ORDER — TRAZODONE HYDROCHLORIDE 50 MG/1
TABLET, FILM COATED ORAL
Qty: 90 TABLET | Refills: 0 | OUTPATIENT
Start: 2023-05-24

## 2023-05-24 RX ORDER — LOVASTATIN 40 MG
TABLET ORAL
Qty: 90 TABLET | Refills: 0 | OUTPATIENT
Start: 2023-05-24

## 2023-06-01 ENCOUNTER — OFFICE VISIT (OUTPATIENT)
Dept: FAMILY MEDICINE | Facility: CLINIC | Age: 83
End: 2023-06-01
Payer: COMMERCIAL

## 2023-06-01 VITALS
WEIGHT: 170.4 LBS | HEIGHT: 66 IN | HEART RATE: 64 BPM | DIASTOLIC BLOOD PRESSURE: 65 MMHG | TEMPERATURE: 98.4 F | OXYGEN SATURATION: 94 % | SYSTOLIC BLOOD PRESSURE: 133 MMHG | BODY MASS INDEX: 27.38 KG/M2 | RESPIRATION RATE: 20 BRPM

## 2023-06-01 DIAGNOSIS — G47.20 CIRCADIAN RHYTHM SLEEP DISORDER: ICD-10-CM

## 2023-06-01 DIAGNOSIS — J44.9 CHRONIC OBSTRUCTIVE PULMONARY DISEASE, UNSPECIFIED COPD TYPE (H): ICD-10-CM

## 2023-06-01 DIAGNOSIS — C49.9 SARCOMA (H): ICD-10-CM

## 2023-06-01 DIAGNOSIS — E78.00 PURE HYPERCHOLESTEROLEMIA: Primary | ICD-10-CM

## 2023-06-01 LAB
ANION GAP SERPL CALCULATED.3IONS-SCNC: 11 MMOL/L (ref 7–15)
BUN SERPL-MCNC: 14.2 MG/DL (ref 8–23)
CALCIUM SERPL-MCNC: 9.5 MG/DL (ref 8.8–10.2)
CHLORIDE SERPL-SCNC: 104 MMOL/L (ref 98–107)
CHOLEST SERPL-MCNC: 141 MG/DL
CREAT SERPL-MCNC: 1.06 MG/DL (ref 0.67–1.17)
DEPRECATED HCO3 PLAS-SCNC: 25 MMOL/L (ref 22–29)
GFR SERPL CREATININE-BSD FRML MDRD: 70 ML/MIN/1.73M2
GLUCOSE SERPL-MCNC: 86 MG/DL (ref 70–99)
HDLC SERPL-MCNC: 61 MG/DL
LDLC SERPL CALC-MCNC: 69 MG/DL
NONHDLC SERPL-MCNC: 80 MG/DL
POTASSIUM SERPL-SCNC: 4.3 MMOL/L (ref 3.4–5.3)
SODIUM SERPL-SCNC: 140 MMOL/L (ref 136–145)
TRIGL SERPL-MCNC: 56 MG/DL

## 2023-06-01 PROCEDURE — 99214 OFFICE O/P EST MOD 30 MIN: CPT | Performed by: FAMILY MEDICINE

## 2023-06-01 PROCEDURE — 36415 COLL VENOUS BLD VENIPUNCTURE: CPT | Performed by: FAMILY MEDICINE

## 2023-06-01 PROCEDURE — 80048 BASIC METABOLIC PNL TOTAL CA: CPT | Performed by: FAMILY MEDICINE

## 2023-06-01 PROCEDURE — 80061 LIPID PANEL: CPT | Performed by: FAMILY MEDICINE

## 2023-06-01 NOTE — PROGRESS NOTES
"  Assessment & Plan     Essential Hypercholesterolemia  Continues on lovastatin without side effects.  Cholesterol levels are within goal range.  - Basic metabolic panel  (Ca, Cl, CO2, Creat, Gluc, K, Na, BUN)  - Lipid panel reflex to direct LDL Fasting    Sleep - wake disorder  Doing well taking trazodone as needed.  He thinks he takes this about twice weekly.  - Basic metabolic panel  (Ca, Cl, CO2, Creat, Gluc, K, Na, BUN)    Sarcoma (H)  Had a detailed discussion today with patient about options.  He says that he realizes that this issue is life-threatening, and does not desire further treatment.  He declines seeing a local oncologist.  - Basic metabolic panel  (Ca, Cl, CO2, Creat, Gluc, K, Na, BUN)    COPD (chronic obstructive pulmonary disease) (H)  Patient notes that his breathing has been good.  He does not use any inhaled medications.               BMI:   Estimated body mass index is 27.5 kg/m  as calculated from the following:    Height as of this encounter: 1.676 m (5' 6\").    Weight as of this encounter: 77.3 kg (170 lb 6.4 oz).           Lilia Vera MD  [facility]    Chema Villa is a 82 year old, presenting for the following health issues:  Recheck Medication (Fasting labs)        6/1/2023  9:30 AM   Additional Questions   Roomed by Yi NAYAK LPN     History of Present Illness       Reason for visit:  Checkup    He eats 4 or more servings of fruits and vegetables daily.He exercises with enough effort to increase his heart rate 20 to 29 minutes per day.    He is taking medications regularly.     Patient presents today for a med check.  In the interim since I saw him last, he was diagnosed with a liposarcoma.  When I saw him for his physical in January 2022, he had hematuria on his problem list, but could not recall any workup or follow-up in relation to this.  UA showed continued hematuria, and CT urogram showed a mass around the right kidney.  Patient was referred to " "Urology, but at some point ended up at [facility].  He was not happy with his care there and expresses suspicion that everyone wanted to \"take his money\" and were only interested in charging for their services.  He saw a surgeon who did recommend resection of the mass.  His oncologist at [facility] is named Walt Courtney and recommended follow-up in 1 year if he did not pursue surgery.  That would have been in April.  Patient says that he is not interested in pursuing any more treatment for this issue as he currently feels well.  Discussed today that this could be life-limiting and that he does not have any other life-limiting conditions currently.  He understands and continues to decline further follow-up.  He describes how many things in his life that he has experienced and accomplished, and relates that he is \"not afraid of dying\".  In terms of his medications, he continues to take trazodone about twice weekly for sleep.  He denies any trouble with his breathing and does not use any inhaled medications.       Review of Systems   Constitutional, HEENT, cardiovascular, pulmonary, gi and gu systems are negative, except as otherwise noted.      Objective    /65   Pulse 64   Temp 98.4  F (36.9  C) (Oral)   Resp 20   Ht 1.676 m (5' 6\")   Wt 77.3 kg (170 lb 6.4 oz)   SpO2 94%   BMI 27.50 kg/m    Body mass index is 27.5 kg/m .  Physical Exam   GENERAL: healthy, alert and no distress  EYES: Eyes grossly normal to inspection, PERRL and conjunctivae and sclerae normal  RESP: lungs clear to auscultation - no rales, rhonchi or wheezes  CV: regular rate and rhythm, normal S1 S2, no S3 or S4, no murmur, click or rub, no peripheral edema and peripheral pulses strong  MS: no gross musculoskeletal defects noted, no edema  NEURO: Normal strength and tone, mentation intact and speech normal  PSYCH: mentation appears normal, affect normal/bright    Results for orders placed or performed in visit on 06/01/23   Basic " metabolic panel  (Ca, Cl, CO2, Creat, Gluc, K, Na, BUN)     Status: Normal   Result Value Ref Range    Sodium 140 136 - 145 mmol/L    Potassium 4.3 3.4 - 5.3 mmol/L    Chloride 104 98 - 107 mmol/L    Carbon Dioxide (CO2) 25 22 - 29 mmol/L    Anion Gap 11 7 - 15 mmol/L    Urea Nitrogen 14.2 8.0 - 23.0 mg/dL    Creatinine 1.06 0.67 - 1.17 mg/dL    Calcium 9.5 8.8 - 10.2 mg/dL    Glucose 86 70 - 99 mg/dL    GFR Estimate 70 >60 mL/min/1.73m2   Lipid panel reflex to direct LDL Fasting     Status: Normal   Result Value Ref Range    Cholesterol 141 <200 mg/dL    Triglycerides 56 <150 mg/dL    Direct Measure HDL 61 >=40 mg/dL    LDL Cholesterol Calculated 69 <=100 mg/dL    Non HDL Cholesterol 80 <130 mg/dL    Narrative    Cholesterol  Desirable:  <200 mg/dL    Triglycerides  Normal:  Less than 150 mg/dL  Borderline High:  150-199 mg/dL  High:  200-499 mg/dL  Very High:  Greater than or equal to 500 mg/dL    Direct Measure HDL  Female:  Greater than or equal to 50 mg/dL   Male:  Greater than or equal to 40 mg/dL    LDL Cholesterol  Desirable:  <100mg/dL  Above Desirable:  100-129 mg/dL   Borderline High:  130-159 mg/dL   High:  160-189 mg/dL   Very High:  >= 190 mg/dL    Non HDL Cholesterol  Desirable:  130 mg/dL  Above Desirable:  130-159 mg/dL  Borderline High:  160-189 mg/dL  High:  190-219 mg/dL  Very High:  Greater than or equal to 220 mg/dL

## 2023-08-10 ENCOUNTER — TELEPHONE (OUTPATIENT)
Dept: FAMILY MEDICINE | Facility: CLINIC | Age: 83
End: 2023-08-10
Payer: COMMERCIAL

## 2023-08-10 NOTE — TELEPHONE ENCOUNTER
Patient is on no medications for his COPD.  In order to qualify for this diagnosis, he needs to not be able to walk more than 200 feet without stopping to rest.  Is this the diagnosis that he is requesting the handicap parking permit for?  (Form is on Yi's desk at [facility])

## 2023-08-10 NOTE — TELEPHONE ENCOUNTER
Spoke to patient and he states he is unable to walk 200 feet without stopping due to his sarcoma.  Disability parking form completed by Dr. Vera and placed up front for . Pt voiced understanding.

## 2023-08-15 DIAGNOSIS — G47.00 INSOMNIA: ICD-10-CM

## 2023-08-15 DIAGNOSIS — E78.5 HYPERLIPIDEMIA: ICD-10-CM

## 2023-08-15 RX ORDER — TRAZODONE HYDROCHLORIDE 50 MG/1
50 TABLET, FILM COATED ORAL AT BEDTIME
Qty: 90 TABLET | Refills: 3 | Status: ON HOLD | OUTPATIENT
Start: 2023-08-15 | End: 2023-11-13

## 2023-08-15 RX ORDER — LOVASTATIN 40 MG
40 TABLET ORAL AT BEDTIME
Qty: 90 TABLET | Refills: 3 | Status: SHIPPED | OUTPATIENT
Start: 2023-08-15

## 2023-08-15 NOTE — TELEPHONE ENCOUNTER
"Last Written Prescription Date:  5/23/2023  Last Fill Quantity: 90,  # refills: 0   Last office visit provider:  6/1/2023     Requested Prescriptions   Pending Prescriptions Disp Refills    lovastatin (MEVACOR) 40 MG tablet [Pharmacy Med Name: LOVASTATIN 40MG TABLETS] 90 tablet 0     Sig: TAKE 1 TABLET(40 MG) BY MOUTH AT BEDTIME       Statins Protocol Passed - 8/15/2023  9:18 AM        Passed - LDL on file in past 12 months     Recent Labs   Lab Test 06/01/23  1011   LDL 69             Passed - No abnormal creatine kinase in past 12 months     No lab results found.             Passed - Recent (12 mo) or future (30 days) visit within the authorizing provider's specialty     Patient has had an office visit with the authorizing provider or a provider within the authorizing providers department within the previous 12 mos or has a future within next 30 days. See \"Patient Info\" tab in in[employer], or \"Choose Columns\" in Meds & Orders section of the refill encounter.              Passed - Medication is active on med list        Passed - Patient is age 18 or older        Last Written Prescription Date:  5/23/2023  Last Fill Quantity: 90,  # refills: 0   Last office visit provider:  6/1/2023       traZODone (DESYREL) 50 MG tablet [Pharmacy Med Name: TRAZODONE 50MG TABLETS] 90 tablet 0     Sig: TAKE 1 TABLET(50 MG) BY MOUTH AT BEDTIME       Serotonin Modulators Passed - 8/15/2023  9:18 AM        Passed - Recent (12 mo) or future (30 days) visit within the authorizing provider's specialty     Patient has had an office visit with the authorizing provider or a provider within the authorizing providers department within the previous 12 mos or has a future within next 30 days. See \"Patient Info\" tab in inbasket, or \"Choose Columns\" in Meds & Orders section of the refill encounter.              Passed - Medication is active on med list        Passed - Patient is age 18 or older             Bhavani Garibay RN 08/15/23 12:36 PM  "

## 2023-11-10 ENCOUNTER — OFFICE VISIT (OUTPATIENT)
Dept: FAMILY MEDICINE | Facility: CLINIC | Age: 83
End: 2023-11-10
Payer: COMMERCIAL

## 2023-11-10 ENCOUNTER — APPOINTMENT (OUTPATIENT)
Dept: CT IMAGING | Facility: HOSPITAL | Age: 83
DRG: 699 | End: 2023-11-10
Attending: EMERGENCY MEDICINE
Payer: COMMERCIAL

## 2023-11-10 ENCOUNTER — NURSE TRIAGE (OUTPATIENT)
Dept: FAMILY MEDICINE | Facility: CLINIC | Age: 83
End: 2023-11-10

## 2023-11-10 ENCOUNTER — HOSPITAL ENCOUNTER (INPATIENT)
Facility: HOSPITAL | Age: 83
LOS: 2 days | Discharge: HOME HEALTH | DRG: 699 | End: 2023-11-13
Attending: EMERGENCY MEDICINE | Admitting: STUDENT IN AN ORGANIZED HEALTH CARE EDUCATION/TRAINING PROGRAM
Payer: COMMERCIAL

## 2023-11-10 VITALS
OXYGEN SATURATION: 96 % | WEIGHT: 145 LBS | HEART RATE: 104 BPM | DIASTOLIC BLOOD PRESSURE: 72 MMHG | SYSTOLIC BLOOD PRESSURE: 131 MMHG | RESPIRATION RATE: 14 BRPM | BODY MASS INDEX: 23.4 KG/M2 | TEMPERATURE: 98.2 F

## 2023-11-10 DIAGNOSIS — K59.00 CONSTIPATION, UNSPECIFIED CONSTIPATION TYPE: Primary | ICD-10-CM

## 2023-11-10 DIAGNOSIS — N17.9 ACUTE RENAL FAILURE, UNSPECIFIED ACUTE RENAL FAILURE TYPE (H): ICD-10-CM

## 2023-11-10 DIAGNOSIS — M62.81 GENERALIZED MUSCLE WEAKNESS: ICD-10-CM

## 2023-11-10 DIAGNOSIS — N17.9 AKI (ACUTE KIDNEY INJURY) (H): Primary | ICD-10-CM

## 2023-11-10 DIAGNOSIS — R39.0: ICD-10-CM

## 2023-11-10 DIAGNOSIS — N32.0 BLADDER OUTLET OBSTRUCTION: ICD-10-CM

## 2023-11-10 LAB
ALBUMIN UR-MCNC: 10 MG/DL
ANION GAP SERPL CALCULATED.3IONS-SCNC: 15 MMOL/L (ref 7–15)
ANION GAP SERPL CALCULATED.3IONS-SCNC: 16 MMOL/L (ref 7–15)
APPEARANCE UR: ABNORMAL
BASOPHILS # BLD AUTO: 0 10E3/UL (ref 0–0.2)
BASOPHILS NFR BLD AUTO: 0 %
BILIRUB UR QL STRIP: NEGATIVE
BUN SERPL-MCNC: 84.2 MG/DL (ref 8–23)
BUN SERPL-MCNC: 84.2 MG/DL (ref 8–23)
CALCIUM SERPL-MCNC: 9.4 MG/DL (ref 8.8–10.2)
CALCIUM SERPL-MCNC: 9.7 MG/DL (ref 8.8–10.2)
CHLORIDE SERPL-SCNC: 95 MMOL/L (ref 98–107)
CHLORIDE SERPL-SCNC: 96 MMOL/L (ref 98–107)
COLOR UR AUTO: ABNORMAL
CREAT SERPL-MCNC: 8.13 MG/DL (ref 0.67–1.17)
CREAT SERPL-MCNC: 8.43 MG/DL (ref 0.67–1.17)
DEPRECATED HCO3 PLAS-SCNC: 23 MMOL/L (ref 22–29)
DEPRECATED HCO3 PLAS-SCNC: 23 MMOL/L (ref 22–29)
EGFRCR SERPLBLD CKD-EPI 2021: 6 ML/MIN/1.73M2
EGFRCR SERPLBLD CKD-EPI 2021: 6 ML/MIN/1.73M2
EOSINOPHIL # BLD AUTO: 0 10E3/UL (ref 0–0.7)
EOSINOPHIL NFR BLD AUTO: 0 %
ERYTHROCYTE [DISTWIDTH] IN BLOOD BY AUTOMATED COUNT: 14.1 % (ref 10–15)
GLUCOSE SERPL-MCNC: 114 MG/DL (ref 70–99)
GLUCOSE SERPL-MCNC: 122 MG/DL (ref 70–99)
GLUCOSE UR STRIP-MCNC: NEGATIVE MG/DL
HCT VFR BLD AUTO: 45.4 % (ref 40–53)
HGB BLD-MCNC: 15.4 G/DL (ref 13.3–17.7)
HGB UR QL STRIP: ABNORMAL
IMM GRANULOCYTES # BLD: 0.1 10E3/UL
IMM GRANULOCYTES NFR BLD: 1 %
KETONES UR STRIP-MCNC: NEGATIVE MG/DL
LEUKOCYTE ESTERASE UR QL STRIP: NEGATIVE
LYMPHOCYTES # BLD AUTO: 0.6 10E3/UL (ref 0.8–5.3)
LYMPHOCYTES NFR BLD AUTO: 3 %
MCH RBC QN AUTO: 27.9 PG (ref 26.5–33)
MCHC RBC AUTO-ENTMCNC: 33.9 G/DL (ref 31.5–36.5)
MCV RBC AUTO: 82 FL (ref 78–100)
MONOCYTES # BLD AUTO: 1.9 10E3/UL (ref 0–1.3)
MONOCYTES NFR BLD AUTO: 11 %
MUCOUS THREADS #/AREA URNS LPF: PRESENT /LPF
NEUTROPHILS # BLD AUTO: 15.4 10E3/UL (ref 1.6–8.3)
NEUTROPHILS NFR BLD AUTO: 85 %
NITRATE UR QL: NEGATIVE
NRBC # BLD AUTO: 0 10E3/UL
NRBC BLD AUTO-RTO: 0 /100
PH UR STRIP: 5 [PH] (ref 5–7)
PLATELET # BLD AUTO: 265 10E3/UL (ref 150–450)
POTASSIUM SERPL-SCNC: 5 MMOL/L (ref 3.4–5.3)
POTASSIUM SERPL-SCNC: 5.2 MMOL/L (ref 3.4–5.3)
RBC # BLD AUTO: 5.51 10E6/UL (ref 4.4–5.9)
RBC URINE: >182 /HPF
SODIUM SERPL-SCNC: 134 MMOL/L (ref 135–145)
SODIUM SERPL-SCNC: 134 MMOL/L (ref 135–145)
SP GR UR STRIP: 1.02 (ref 1–1.03)
SQUAMOUS EPITHELIAL: <1 /HPF
UROBILINOGEN UR STRIP-MCNC: <2 MG/DL
WBC # BLD AUTO: 18 10E3/UL (ref 4–11)
WBC URINE: 0 /HPF

## 2023-11-10 PROCEDURE — 81001 URINALYSIS AUTO W/SCOPE: CPT | Performed by: EMERGENCY MEDICINE

## 2023-11-10 PROCEDURE — 250N000011 HC RX IP 250 OP 636: Mod: JZ | Performed by: EMERGENCY MEDICINE

## 2023-11-10 PROCEDURE — 99285 EMERGENCY DEPT VISIT HI MDM: CPT | Mod: 25

## 2023-11-10 PROCEDURE — 96360 HYDRATION IV INFUSION INIT: CPT

## 2023-11-10 PROCEDURE — 250N000011 HC RX IP 250 OP 636: Mod: JZ | Performed by: STUDENT IN AN ORGANIZED HEALTH CARE EDUCATION/TRAINING PROGRAM

## 2023-11-10 PROCEDURE — 80048 BASIC METABOLIC PNL TOTAL CA: CPT | Performed by: EMERGENCY MEDICINE

## 2023-11-10 PROCEDURE — 258N000003 HC RX IP 258 OP 636: Performed by: STUDENT IN AN ORGANIZED HEALTH CARE EDUCATION/TRAINING PROGRAM

## 2023-11-10 PROCEDURE — G0378 HOSPITAL OBSERVATION PER HR: HCPCS

## 2023-11-10 PROCEDURE — 99223 1ST HOSP IP/OBS HIGH 75: CPT | Performed by: STUDENT IN AN ORGANIZED HEALTH CARE EDUCATION/TRAINING PROGRAM

## 2023-11-10 PROCEDURE — 99214 OFFICE O/P EST MOD 30 MIN: CPT | Performed by: PHYSICIAN ASSISTANT

## 2023-11-10 PROCEDURE — 96361 HYDRATE IV INFUSION ADD-ON: CPT

## 2023-11-10 PROCEDURE — 258N000003 HC RX IP 258 OP 636: Performed by: EMERGENCY MEDICINE

## 2023-11-10 PROCEDURE — 36415 COLL VENOUS BLD VENIPUNCTURE: CPT | Performed by: EMERGENCY MEDICINE

## 2023-11-10 PROCEDURE — 85025 COMPLETE CBC W/AUTO DIFF WBC: CPT | Performed by: EMERGENCY MEDICINE

## 2023-11-10 PROCEDURE — 250N000009 HC RX 250: Performed by: EMERGENCY MEDICINE

## 2023-11-10 PROCEDURE — 74176 CT ABD & PELVIS W/O CONTRAST: CPT

## 2023-11-10 PROCEDURE — 96372 THER/PROPH/DIAG INJ SC/IM: CPT | Performed by: STUDENT IN AN ORGANIZED HEALTH CARE EDUCATION/TRAINING PROGRAM

## 2023-11-10 RX ORDER — CEFTRIAXONE 1 G/1
1 INJECTION, POWDER, FOR SOLUTION INTRAMUSCULAR; INTRAVENOUS ONCE
Status: COMPLETED | OUTPATIENT
Start: 2023-11-10 | End: 2023-11-11

## 2023-11-10 RX ORDER — LIDOCAINE HYDROCHLORIDE 20 MG/ML
10 JELLY TOPICAL ONCE
Status: COMPLETED | OUTPATIENT
Start: 2023-11-10 | End: 2023-11-10

## 2023-11-10 RX ORDER — POLYETHYLENE GLYCOL 3350 17 G/17G
17 POWDER, FOR SOLUTION ORAL DAILY PRN
Status: DISCONTINUED | OUTPATIENT
Start: 2023-11-10 | End: 2023-11-13 | Stop reason: HOSPADM

## 2023-11-10 RX ORDER — PROCHLORPERAZINE 25 MG
12.5 SUPPOSITORY, RECTAL RECTAL EVERY 12 HOURS PRN
Status: DISCONTINUED | OUTPATIENT
Start: 2023-11-10 | End: 2023-11-13 | Stop reason: HOSPADM

## 2023-11-10 RX ORDER — HYDROMORPHONE HCL IN WATER/PF 6 MG/30 ML
0.2 PATIENT CONTROLLED ANALGESIA SYRINGE INTRAVENOUS
Status: DISCONTINUED | OUTPATIENT
Start: 2023-11-10 | End: 2023-11-13 | Stop reason: HOSPADM

## 2023-11-10 RX ORDER — HYDROMORPHONE HCL IN WATER/PF 6 MG/30 ML
0.4 PATIENT CONTROLLED ANALGESIA SYRINGE INTRAVENOUS
Status: DISCONTINUED | OUTPATIENT
Start: 2023-11-10 | End: 2023-11-13 | Stop reason: HOSPADM

## 2023-11-10 RX ORDER — OXYCODONE HYDROCHLORIDE 5 MG/1
5 TABLET ORAL EVERY 4 HOURS PRN
Status: DISCONTINUED | OUTPATIENT
Start: 2023-11-10 | End: 2023-11-11

## 2023-11-10 RX ORDER — PROCHLORPERAZINE MALEATE 5 MG
5 TABLET ORAL EVERY 6 HOURS PRN
Status: DISCONTINUED | OUTPATIENT
Start: 2023-11-10 | End: 2023-11-13 | Stop reason: HOSPADM

## 2023-11-10 RX ORDER — TRAZODONE HYDROCHLORIDE 50 MG/1
50 TABLET, FILM COATED ORAL AT BEDTIME
Status: DISCONTINUED | OUTPATIENT
Start: 2023-11-11 | End: 2023-11-13 | Stop reason: HOSPADM

## 2023-11-10 RX ORDER — ATORVASTATIN CALCIUM 10 MG/1
10 TABLET, FILM COATED ORAL DAILY
Status: DISCONTINUED | OUTPATIENT
Start: 2023-11-11 | End: 2023-11-13 | Stop reason: HOSPADM

## 2023-11-10 RX ORDER — CEFTRIAXONE 1 G/1
1 INJECTION, POWDER, FOR SOLUTION INTRAMUSCULAR; INTRAVENOUS AT BEDTIME
Status: DISCONTINUED | OUTPATIENT
Start: 2023-11-11 | End: 2023-11-11

## 2023-11-10 RX ORDER — ONDANSETRON 4 MG/1
4 TABLET, ORALLY DISINTEGRATING ORAL EVERY 6 HOURS PRN
Status: DISCONTINUED | OUTPATIENT
Start: 2023-11-10 | End: 2023-11-13 | Stop reason: HOSPADM

## 2023-11-10 RX ORDER — HEPARIN SODIUM 5000 [USP'U]/.5ML
5000 INJECTION, SOLUTION INTRAVENOUS; SUBCUTANEOUS EVERY 12 HOURS
Status: DISCONTINUED | OUTPATIENT
Start: 2023-11-10 | End: 2023-11-13

## 2023-11-10 RX ORDER — ACETAMINOPHEN 325 MG/1
650 TABLET ORAL EVERY 6 HOURS PRN
Status: DISCONTINUED | OUTPATIENT
Start: 2023-11-10 | End: 2023-11-13 | Stop reason: HOSPADM

## 2023-11-10 RX ORDER — SODIUM CHLORIDE 9 MG/ML
INJECTION, SOLUTION INTRAVENOUS CONTINUOUS
Status: DISCONTINUED | OUTPATIENT
Start: 2023-11-10 | End: 2023-11-11

## 2023-11-10 RX ORDER — ACETAMINOPHEN 650 MG/1
650 SUPPOSITORY RECTAL EVERY 6 HOURS PRN
Status: DISCONTINUED | OUTPATIENT
Start: 2023-11-10 | End: 2023-11-13 | Stop reason: HOSPADM

## 2023-11-10 RX ORDER — ONDANSETRON 2 MG/ML
4 INJECTION INTRAMUSCULAR; INTRAVENOUS EVERY 6 HOURS PRN
Status: DISCONTINUED | OUTPATIENT
Start: 2023-11-10 | End: 2023-11-13 | Stop reason: HOSPADM

## 2023-11-10 RX ADMIN — CEFTRIAXONE SODIUM 1 G: 1 INJECTION, POWDER, FOR SOLUTION INTRAMUSCULAR; INTRAVENOUS at 22:37

## 2023-11-10 RX ADMIN — SODIUM CHLORIDE: 9 INJECTION, SOLUTION INTRAVENOUS at 22:37

## 2023-11-10 RX ADMIN — LIDOCAINE HYDROCHLORIDE 10 ML: 20 JELLY TOPICAL at 22:43

## 2023-11-10 RX ADMIN — SODIUM CHLORIDE 500 ML: 9 INJECTION, SOLUTION INTRAVENOUS at 19:39

## 2023-11-10 RX ADMIN — HEPARIN SODIUM 5000 UNITS: 10000 INJECTION, SOLUTION INTRAVENOUS; SUBCUTANEOUS at 22:48

## 2023-11-10 ASSESSMENT — ENCOUNTER SYMPTOMS
FEVER: 0
VOMITING: 0
COUGH: 0
CONSTIPATION: 1
NAUSEA: 0
DYSURIA: 0
ABDOMINAL PAIN: 1
SHORTNESS OF BREATH: 0

## 2023-11-10 ASSESSMENT — ACTIVITIES OF DAILY LIVING (ADL)
ADLS_ACUITY_SCORE: 33
ADLS_ACUITY_SCORE: 35
ADLS_ACUITY_SCORE: 35
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
## 2023-11-10 NOTE — ED TRIAGE NOTES
Constipation with abdominal pain x 1 month. Seen at [facility] this past tuesday, given enema that pt reports being very uncomfortable. Rectal pain and ongoing abdominal pain with small BM's since. Primary office recommended going to walk in clinic to check for impaction, due to rectal pain, as primary office had no openings. Pt did go to walk in clinic, but they could not perform this service there and pt was told to come to ED.      Triage Assessment (Adult)       Row Name 11/10/23 9927          Triage Assessment    Airway WDL WDL        Respiratory WDL    Respiratory WDL WDL        Skin Circulation/Temperature WDL    Skin Circulation/Temperature WDL WDL        Cardiac WDL    Cardiac WDL WDL except for tachycardia        Peripheral/Neurovascular WDL    Peripheral Neurovascular WDL WDL        Cognitive/Neuro/Behavioral WDL    Cognitive/Neuro/Behavioral WDL WDL

## 2023-11-10 NOTE — TELEPHONE ENCOUNTER
"Nurse Triage SBAR    Is this a 2nd Level Triage? YES, LICENSED PRACTITIONER REVIEW IS REQUIRED    Situation: Nurse reached out to patient per PCP recommendation. Patient is having abdominal pain. Pain is mild at rest, but excruciating with movement. Patient also reports that they have not defecated \"in three weeks.\" Patient reports that they are having leakage of stool from the anus, but no true BM. Nurse spoke to patient's caregiver as well, who stated that the patient has been given laxatives and has shown evidence of defecation, but that patient denies this. Patient reports abdomen fulness/distention.     Background: Patient is a 83 year old male who has non-cancerous polyps of the large intestine.     Assessment: Patient may have fecal impaction. Recommended he be seen due to abdominal pain with movement and reported lack of bowel movements.     Protocol Recommended Disposition:   See in Office Today    Recommendation: Please review above information and evaluate. Patient's caregiver is going to take him to [facility]. Patient is agreeable to going.      Routed to provider    Does the patient meet one of the following criteria for ADS visit consideration? No     Reason for Disposition   Last bowel movement (BM) > 4 days ago    Additional Information   Negative: Passed out (i.e., fainted, collapsed and was not responding)   Negative: Shock suspected (e.g., cold/pale/clammy skin, too weak to stand, low BP, rapid pulse)   Negative: Sounds like a life-threatening emergency to the triager   Negative: Followed an abdomen (stomach) injury   Negative: Chest pain   Negative: Pain is mainly in upper abdomen (if needed ask: 'is it mainly above the belly button?')   Negative: Abdomen bloating or swelling are main symptoms   Negative: Vomiting bile (green color)   Negative: Patient sounds very sick or weak to the triager   Negative: Constant abdominal pain lasting > 2 hours   Negative: Vomiting and abdomen looks " - 7 U detemir BID  - LDSSI  - Diabetic diet  Glucose well controlled 109-163 throughout admission     much more swollen than usual   Negative: Rectal pain or fullness from fecal impaction (rectum full of stool) and NOT better after SITZ bath, suppository or enema    Protocols used: Abdominal Pain - Male-A-OH, Constipation-A-OH

## 2023-11-10 NOTE — TELEPHONE ENCOUNTER
Incoming call from patient's caregiver Vero wanting to schedule patient with Dr. Mehta for abdominal pain. Patient is scheduled on 11/22 but Dr. Mehta want RN to call for triage and see if patient can wait to be seen on 11/22 or if he needs to come in sooner. Please let Dr. Mehta know.

## 2023-11-11 ENCOUNTER — APPOINTMENT (OUTPATIENT)
Dept: OCCUPATIONAL THERAPY | Facility: HOSPITAL | Age: 83
DRG: 699 | End: 2023-11-11
Attending: STUDENT IN AN ORGANIZED HEALTH CARE EDUCATION/TRAINING PROGRAM
Payer: COMMERCIAL

## 2023-11-11 PROBLEM — N17.9 ACUTE RENAL FAILURE, UNSPECIFIED ACUTE RENAL FAILURE TYPE (H): Status: ACTIVE | Noted: 2023-11-11

## 2023-11-11 LAB
ALBUMIN SERPL BCG-MCNC: 2.8 G/DL (ref 3.5–5.2)
ALP SERPL-CCNC: 70 U/L (ref 40–129)
ALT SERPL W P-5'-P-CCNC: 16 U/L (ref 0–70)
ANION GAP SERPL CALCULATED.3IONS-SCNC: 11 MMOL/L (ref 7–15)
AST SERPL W P-5'-P-CCNC: 17 U/L (ref 0–45)
BILIRUB SERPL-MCNC: 0.5 MG/DL
BUN SERPL-MCNC: 54.2 MG/DL (ref 8–23)
CALCIUM SERPL-MCNC: 8.6 MG/DL (ref 8.8–10.2)
CHLORIDE SERPL-SCNC: 105 MMOL/L (ref 98–107)
CREAT SERPL-MCNC: 3.23 MG/DL (ref 0.67–1.17)
DEPRECATED HCO3 PLAS-SCNC: 25 MMOL/L (ref 22–29)
EGFRCR SERPLBLD CKD-EPI 2021: 18 ML/MIN/1.73M2
ERYTHROCYTE [DISTWIDTH] IN BLOOD BY AUTOMATED COUNT: 14.4 % (ref 10–15)
GLUCOSE SERPL-MCNC: 89 MG/DL (ref 70–99)
HCT VFR BLD AUTO: 41.2 % (ref 40–53)
HGB BLD-MCNC: 13.6 G/DL (ref 13.3–17.7)
INR PPP: 1.33 (ref 0.85–1.15)
MCH RBC QN AUTO: 27.4 PG (ref 26.5–33)
MCHC RBC AUTO-ENTMCNC: 33 G/DL (ref 31.5–36.5)
MCV RBC AUTO: 83 FL (ref 78–100)
PLATELET # BLD AUTO: 239 10E3/UL (ref 150–450)
POTASSIUM SERPL-SCNC: 4.3 MMOL/L (ref 3.4–5.3)
PROT SERPL-MCNC: 5.5 G/DL (ref 6.4–8.3)
RBC # BLD AUTO: 4.97 10E6/UL (ref 4.4–5.9)
SODIUM SERPL-SCNC: 141 MMOL/L (ref 135–145)
WBC # BLD AUTO: 12 10E3/UL (ref 4–11)

## 2023-11-11 PROCEDURE — G0378 HOSPITAL OBSERVATION PER HR: HCPCS

## 2023-11-11 PROCEDURE — 250N000013 HC RX MED GY IP 250 OP 250 PS 637: Performed by: STUDENT IN AN ORGANIZED HEALTH CARE EDUCATION/TRAINING PROGRAM

## 2023-11-11 PROCEDURE — 97535 SELF CARE MNGMENT TRAINING: CPT | Mod: GO

## 2023-11-11 PROCEDURE — 120N000001 HC R&B MED SURG/OB

## 2023-11-11 PROCEDURE — 250N000011 HC RX IP 250 OP 636: Mod: JZ | Performed by: STUDENT IN AN ORGANIZED HEALTH CARE EDUCATION/TRAINING PROGRAM

## 2023-11-11 PROCEDURE — 85610 PROTHROMBIN TIME: CPT | Performed by: STUDENT IN AN ORGANIZED HEALTH CARE EDUCATION/TRAINING PROGRAM

## 2023-11-11 PROCEDURE — 85027 COMPLETE CBC AUTOMATED: CPT | Performed by: STUDENT IN AN ORGANIZED HEALTH CARE EDUCATION/TRAINING PROGRAM

## 2023-11-11 PROCEDURE — 97165 OT EVAL LOW COMPLEX 30 MIN: CPT | Mod: GO

## 2023-11-11 PROCEDURE — 36415 COLL VENOUS BLD VENIPUNCTURE: CPT | Performed by: STUDENT IN AN ORGANIZED HEALTH CARE EDUCATION/TRAINING PROGRAM

## 2023-11-11 PROCEDURE — 258N000003 HC RX IP 258 OP 636: Performed by: STUDENT IN AN ORGANIZED HEALTH CARE EDUCATION/TRAINING PROGRAM

## 2023-11-11 PROCEDURE — 96372 THER/PROPH/DIAG INJ SC/IM: CPT | Performed by: STUDENT IN AN ORGANIZED HEALTH CARE EDUCATION/TRAINING PROGRAM

## 2023-11-11 PROCEDURE — 80053 COMPREHEN METABOLIC PANEL: CPT | Performed by: STUDENT IN AN ORGANIZED HEALTH CARE EDUCATION/TRAINING PROGRAM

## 2023-11-11 PROCEDURE — 99233 SBSQ HOSP IP/OBS HIGH 50: CPT | Performed by: STUDENT IN AN ORGANIZED HEALTH CARE EDUCATION/TRAINING PROGRAM

## 2023-11-11 RX ORDER — SODIUM CHLORIDE, SODIUM LACTATE, POTASSIUM CHLORIDE, CALCIUM CHLORIDE 600; 310; 30; 20 MG/100ML; MG/100ML; MG/100ML; MG/100ML
INJECTION, SOLUTION INTRAVENOUS CONTINUOUS
Status: DISCONTINUED | OUTPATIENT
Start: 2023-11-11 | End: 2023-11-12

## 2023-11-11 RX ORDER — POLYETHYLENE GLYCOL 3350 17 G/17G
17 POWDER, FOR SOLUTION ORAL DAILY
Status: DISCONTINUED | OUTPATIENT
Start: 2023-11-11 | End: 2023-11-13 | Stop reason: HOSPADM

## 2023-11-11 RX ADMIN — ATORVASTATIN CALCIUM 10 MG: 10 TABLET, FILM COATED ORAL at 09:55

## 2023-11-11 RX ADMIN — POLYETHYLENE GLYCOL 3350 17 G: 17 POWDER, FOR SOLUTION ORAL at 18:38

## 2023-11-11 RX ADMIN — HEPARIN SODIUM 5000 UNITS: 10000 INJECTION, SOLUTION INTRAVENOUS; SUBCUTANEOUS at 09:55

## 2023-11-11 RX ADMIN — HEPARIN SODIUM 5000 UNITS: 10000 INJECTION, SOLUTION INTRAVENOUS; SUBCUTANEOUS at 21:47

## 2023-11-11 RX ADMIN — POLYETHYLENE GLYCOL 3350 17 G: 17 POWDER, FOR SOLUTION ORAL at 09:58

## 2023-11-11 RX ADMIN — SODIUM CHLORIDE, POTASSIUM CHLORIDE, SODIUM LACTATE AND CALCIUM CHLORIDE: 600; 310; 30; 20 INJECTION, SOLUTION INTRAVENOUS at 17:12

## 2023-11-11 ASSESSMENT — ACTIVITIES OF DAILY LIVING (ADL)
ADLS_ACUITY_SCORE: 28
ADLS_ACUITY_SCORE: 37
ADLS_ACUITY_SCORE: 37
ADLS_ACUITY_SCORE: 28
ADLS_ACUITY_SCORE: 37
DEPENDENT_IADLS:: INDEPENDENT
ADLS_ACUITY_SCORE: 28
ADLS_ACUITY_SCORE: 37

## 2023-11-11 NOTE — PROGRESS NOTES
"PRIMARY DIAGNOSIS: \"GENERIC\" NURSING  OUTPATIENT/OBSERVATION GOALS TO BE MET BEFORE DISCHARGE:  ADLs back to baseline: No    Activity and level of assistance: Up with standby assistance.    Pain status: Improved-controlled with oral pain medications.    Return to near baseline physical activity: Yes     Discharge Planner Nurse   Safe discharge environment identified: Yes  Barriers to discharge: Yes       Entered by: Dhara Mendez RN 11/11/2023 12:14 PM    VSS, c/o abdominal 2/10, RUQ and LLQ abdomen tender on palpation. Reports pain in tolerable. Castle catheter patent and draining brown urine. Normal sinus with occasional PVCs     Please review provider order for any additional goals.   Nurse to notify provider when observation goals have been met and patient is ready for discharge.  "

## 2023-11-11 NOTE — PLAN OF CARE
"Expand All Collapse All    PRIMARY DIAGNOSIS: \"GENERIC\" NURSING  OUTPATIENT/OBSERVATION GOALS TO BE MET BEFORE DISCHARGE:  ADLs back to baseline: Yes     Activity and level of assistance: Up with standby assistance.     Pain status: Improved-no pain meds needed at this time.     Return to near baseline physical activity: Yes             Discharge Planner Nurse   Safe discharge environment identified: Yes  Barriers to discharge: Yes-hernandes cath        Patient reports abdominal pain only with movement.  Hernandes patent, draining clear, naveen urine.             "
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
## 2023-11-11 NOTE — MEDICATION SCRIBE - ADMISSION MEDICATION HISTORY
Medication Scribe Admission Medication History    Admission medication history is complete. The information provided in this note is only as accurate as the sources available at the time of the update.    Information Source(s): Patient via in-person    Pertinent Information:     Changes made to PTA medication list:  Added: None  Deleted: Sildenafil 100 mg tablet (per patient)  Changed: None    Medication Affordability:  Not including over the counter (OTC) medications, was there a time in the past 3 months when you did not take your medications as prescribed because of cost?: No    Allergies reviewed with patient and updates made in EHR: yes    Medication History Completed By: Denia Aparicio 11/10/2023 10:00 PM    PTA Med List   Medication Sig Last Dose    lovastatin (MEVACOR) 40 MG tablet TAKE 1 TABLET(40 MG) BY MOUTH AT BEDTIME 11/9/2023 at hs    traZODone (DESYREL) 50 MG tablet TAKE 1 TABLET(50 MG) BY MOUTH AT BEDTIME 11/9/2023 at hs

## 2023-11-11 NOTE — PLAN OF CARE
"PRIMARY DIAGNOSIS: \"GENERIC\" NURSING  OUTPATIENT/OBSERVATION GOALS TO BE MET BEFORE DISCHARGE:  ADLs back to baseline: Yes    Activity and level of assistance: Up with standby assistance.    Pain status: Improved-controlled with oral pain medications.    Return to near baseline physical activity: Yes     Discharge Planner Nurse   Safe discharge environment identified: Yes  Barriers to discharge: Yes, Urology consult.        Entered by: Carie Gutierrez RN 11/11/2023 2:43 AM     Please review provider order for any additional goals.   Nurse to notify provider when observation goals have been met and patient is ready for discharge.Goal Outcome Evaluation:    "

## 2023-11-11 NOTE — PROGRESS NOTES
"   11/11/23 4535   Appointment Info   Signing Clinician's Name / Credentials (OT) Xiomy Hendrickson, OTR/L   Quick Adds   Quick Adds Certification   Living Environment   People in Home significant other  (girlfriend)   Current Living Arrangements house  (split level)   Home Accessibility stairs within home   Number of Stairs, Within Home, Primary six;eight  (6 up, 8 down)   Self-Care   Equipment Currently Used at Home none   Activity/Exercise/Self-Care Comment Pt independent with ADLs at baseline.   Instrumental Activities of Daily Living (IADL)   IADL Comments Pt very independent and active at baseline, per pt rides a motorcycle and does yardwork. Has 2 dogs.   General Information   Onset of Illness/Injury or Date of Surgery 11/10/23   Referring Physician Ian Espino DO   Patient/Family Therapy Goal Statement (OT) to go home once pain is managed   Additional Occupational Profile Info/Pertinent History of Current Problem Per chart review, pt \"is a 83M presents with acute abdominal pain; pmhx includes COPD, HLD, psoriasis; admitted for acute urinary retention, L renal calyx rupture.\"   Existing Precautions/Restrictions fall  (hernandes catheter)   Cognitive Status Examination   Cognitive Status Comments Pt noted to have decreased safety and increased impulsiveness when experiencing pain.   Pain Assessment   Patient Currently in Pain Yes, see Vital Sign flowsheet  (pain when sitting or transitioning stand>sit)   Range of Motion Comprehensive   General Range of Motion no range of motion deficits identified   Strength Comprehensive (MMT)   General Manual Muscle Testing (MMT) Assessment no strength deficits identified   Bed Mobility   Bed Mobility supine-sit;sit-supine   Supine-Sit [place] (Bed Mobility) minimum assist (75% patient effort)   Sit-Supine [place] (Bed Mobility) supervision   Assistive Device (Bed Mobility) bed rails   Transfers   Transfers sit-stand transfer;toilet transfer   Sit-Stand Transfer "   Sit-Stand [place] (Transfers) minimum assist (75% patient effort)   Sit/Stand Transfer Comments 1 LOB   Toilet Transfer   Type (Toilet Transfer) sit-stand;stand-sit   [place] Level (Toilet Transfer) contact guard   Assistive Device (Toilet Transfer) grab bars/safety frame   Balance   Balance Comments Pt steady with ambulation with CGA/SBA, with transitions stand>sit and bed mobility pain makes pt impulsive, loses balance requiring Min A for safety.   Activities of Daily Living   BADL Assessment/Intervention toileting;upper body dressing;grooming   Upper Body Dressing Assessment/Training   Comment, (Upper Body Dressing) d/t distracted by pain   [place] Level (Upper Body Dressing) minimum assist (75% patient effort)   Grooming Assessment/Training   Position (Grooming) sink side   [place] Level (Grooming) supervision   Toileting   Comment, (Toileting) standing to complete pericares   [place] Level (Toileting) supervision;contact guard assist   Clinical Impression   Criteria for Skilled Therapeutic Interventions Met (OT) Yes, treatment indicated   OT Diagnosis Impaired safety with ADLs and functional mobility.   Influenced by the following impairments urine extravasation   OT Problem List-Impairments impacting ADL problems related to;activity tolerance impaired;balance;cognition;mobility;pain   Assessment of Occupational Performance 1-3 Performance Deficits   Identified Performance Deficits bed mobility, functional mobility, toileting   Planned Therapy Interventions (OT) ADL retraining;IADL retraining;balance training;bed mobility training;cognition;transfer training;home program guidelines;progressive activity/exercise;risk factor education   Clinical Decision Making Complexity (OT) problem focused assessment/low complexity   Risk & Benefits of therapy have been explained evaluation/treatment results reviewed;care plan/treatment goals reviewed;risks/benefits reviewed;current/potential  barriers reviewed;participants voiced agreement with care plan;participants included;patient   Clinical Impression Comments Pt would benefit from skilled OT services to promote ability to perform ADLs, IADLs, and functional mobility safely.   OT Total Evaluation Time   OT Eval, Low Complexity Minutes (51648) 10   Therapy Certification   Medical Diagnosis urine extravasation   Start of Care Date 11/11/23   Certification date from 11/11/23   Certification date to 11/18/23   OT Goals   Therapy Frequency (OT) Daily   OT Predicted Duration/Target Date for Goal Attainment 11/18/23   OT Goals Bed Mobility;Transfers;Toilet Transfer/Toileting   OT: Bed Mobility Modified independent;supine to/from sitting   OT: Transfer Modified independent;with assistive device   OT: Toilet Transfer/Toileting Modified independent   Self-Care/Home Management   Self-Care/Home Mgmt/ADL, Compensatory, Meal Prep Minutes (59289) 14   Symptoms Noted During/After Treatment (Meal Preparation/Planning Training) fatigue;increased pain   Treatment Detail/Skilled Intervention Pt noted to have BM incontinence in bed, agreeable to ambulate to bathroom to clean up. Upon first STS, pt experienced signficant pain and had LOB, Min A to safely lower to EOB. After rest break, pt agreeable to attempt again, declined trialing FWW. CGA to ambulate to bathroom with no LOB. CGA for toilet transfer no LOB, pt states no pain with sitting on toilet. Pt tolerated standing for ~4 min in bathroom to complete pericares with SBA/CGA and set up A, pt unsteady on feet at times with no seated rest break needed and no signifcant LOB. Pt required assistance managing catheter bag. Pt able to tolerate standing at sink for 2 min with SBA no LOB. Pt ambulated 10' back to bedroom with close SBA, no LOB. Pt completed additional STS and ambulated in room with SBA 2x, second time increased pain and decreased safety noted getting into bed. Pt left in bed at end of session with call light  in reach.   OT Discharge Planning   OT Plan toileting, safety with transfers, bed mobility, monitor cognition, dynamic standing balance   OT Discharge Recommendation (DC Rec) home with assist   OT Rationale for DC Rec Pt moving well when pain is controlled, anticipate as pain improves pt will be safe to discharge home with assistance from significant other as needed.   OT Brief overview of current status SBA-Min A mobility and ADLs depending on level of pain   Total Session Time   Timed Code Treatment Minutes 14   Total Session Time (sum of timed and untimed services) 24      [facility]  OUTPATIENT OCCUPATIONAL THERAPY  EVALUATION  PLAN OF TREATMENT FOR OUTPATIENT REHABILITATION  (COMPLETE FOR INITIAL CLAIMS ONLY)  Patient's Last Name, First Name, M.I.  YOB: 1940  Allen Grubbs                          Provider's Name  [facility] Medical Record No.  [id number]                             Onset Date:  11/10/23   Start of Care Date:  11/11/23   Type:     ___PT   _X_OT   ___SLP Medical Diagnosis:  urine extravasation                    OT Diagnosis:  Impaired safety with ADLs and functional mobility. Visits from SOC:  1     See note for plan of treatment, functional goals and certification details    I CERTIFY THE NEED FOR THESE SERVICES FURNISHED UNDER        THIS PLAN OF TREATMENT AND WHILE UNDER MY CARE     (Physician co-signature of this document indicates review and certification of the therapy plan).

## 2023-11-11 NOTE — PROGRESS NOTES
"PRIMARY DIAGNOSIS: \"GENERIC\" NURSING  OUTPATIENT/OBSERVATION GOALS TO BE MET BEFORE DISCHARGE:  ADLs back to baseline: Yes    Activity and level of assistance: Up with standby assistance.    Pain status: Improved-controlled with oral pain medications.    Return to near baseline physical activity: Yes     Discharge Planner Nurse   Safe discharge environment identified: Yes  Barriers to discharge: Yes       Entered by: Dhara Mendez RN 11/11/2023 3:07 PM   VSS, up to bathroom with assistance, hernandes catheter patent and draining dark naveen urine, mild pain with movement, declined PRN pain medication  Please review provider order for any additional goals.   Nurse to notify provider when observation goals have been met and patient is ready for discharge.  "

## 2023-11-11 NOTE — PLAN OF CARE
NSG TRANSPORT NOTE  Data:   Reason for Transport:  room available    Allen Grubbs was transported to  room 428 via wheel chair at 1650.  Patient was accompanied by Registered Nurse. Equipment used for transport: None. Family was aware of reason for transport: yes    Action:  Report: given to CHAZ Monson    Response:  Patient's condition when transferred off unit was Stable.    Mary Johnston RN

## 2023-11-11 NOTE — PLAN OF CARE
"PRIMARY DIAGNOSIS: \"GENERIC\" NURSING  OUTPATIENT/OBSERVATION GOALS TO BE MET BEFORE DISCHARGE:  ADLs back to baseline: No    Activity and level of assistance: Up with standby assistance.    Pain status: Improved-controlled with oral pain medications.    Return to near baseline physical activity: Yes     Discharge Planner Nurse   Safe discharge environment identified: Yes  Barriers to discharge: Yes       Entered by: Carie Gutierrez RN 11/11/2023 5:45 AM     Please review provider order for any additional goals.   Nurse to notify provider when observation goals have been met and patient is ready for discharge.Goal Outcome Evaluation:    VSS on room air. Rates abdominal pain at 2/10. Pain tolerable. RUQ and LLQ abdomen tender on palpation. Castle patent and draining brown urine with sediments. VSS on room air. Iv fluids running continuous. NPO for urology consult today.   NSR with occasional PVCs and bigeminy on cardiac monitor. Pt asymptomatic.   "

## 2023-11-11 NOTE — PROGRESS NOTES
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   Bilateral hydronephrosis   - urology consulted, no need for procedure as Hernandes relieved the obstruction  - will check calcium, TSH, meds   - continue Hernandes and will need follow up with urology in 2 weeks   - PT/ OT  - initially was planning on discharge with repeat kidney U/S and urology follow up, however with increased blood in hernandes and slight drop in urine output this afternoon will elect to watch overnight   - UA not convincing for infection and patient without urinary symptoms, will discontinue ceftriaxone     COPD  HLD  - PTA statin, as needed neb (not on any home COPD meds)     Diet: NPO per Anesthesia Guidelines for Procedure/Surgery Except for: Meds, Ice Chips    DVT Prophylaxis: Heparin SQ  Hernandes Catheter: PRESENT, indication: Other (Comment);Retention (L renal calyx rupture)  Lines: None     Cardiac Monitoring: None  Code Status: Full Code      Clinically Significant Risk Factors Present on Admission                                  Disposition Plan discharge in next 1-2 days pending therapy eval      Expected Discharge Date: 11/11/2023                    Ian Espino DO  Hospitalist Service  [facility]  Securely message with [email or web address] (more info)  Text page via [employer] Paging/Directory   ______________________________________________________________________    Interval History   Doing better this AM. States he feels a bit tired but otherwise denies fevers, dysuria.     Physical Exam   Vital Signs: Temp: 98.5  F (36.9  C) Temp src: Oral BP: 122/68 Pulse: 78   Resp: 24 SpO2: 92 % O2 Device: None (Room air)    Weight: 145 lbs 0 oz    General  Appearance: Non-toxic  Respiratory: CTAB, breathing comfortably  Cardiovascular: RRR  GI: Mild distention with no pain  Skin: No rash   Other: Blood in hernandes    Medical Decision Making       55 MINUTES SPENT BY ME on the date of service doing chart review, history, exam, documentation & further activities per the note.      Data   Imaging results reviewed over the past 24 hrs:   Recent Results (from the past 24 hour(s))   CT Abdomen Pelvis w/o Contrast    Narrative    EXAM: CT ABDOMEN PELVIS W/O CONTRAST  LOCATION: [facility]  DATE: 11/10/2023    INDICATION: diffuse abd pain, ongoing constripation, had CT 11 6 23 at [facility] ER.  COMPARISON: None.  TECHNIQUE: CT scan of the abdomen and pelvis was performed without IV contrast. Multiplanar reformats were obtained. Dose reduction techniques were used.  CONTRAST: None.    FINDINGS:   LOWER CHEST: Small left pleural effusion new compared to prior study.    HEPATOBILIARY: Cholelithiasis unchanged, no definite acute inflammatory process evident.    PANCREAS: Normal.    SPLEEN: Normal.    ADRENAL GLANDS: Normal.  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    KIDNEYS/BLADDER: There is now prominent dilatation of bladder with estimated volume of nearly 800 mL. There is new modest bilateral hydronephrosis/hydroureter. Extensive retroperitoneal soft tissue stranding and perinephric edema about the left kidney   likely relates to calyceal disruption due to back pressure. Faint dilute contrast material present within collecting system and bladder. Similar faint contrast is seen within the left retroperitoneal soft tissues consistent with urine extravasation.  Findings consistent with bladder outlet obstruction.    BOWEL: No obstruction or inflammatory change.    LYMPH NODES: Normal.    VASCULATURE: Unremarkable.    PELVIC ORGANS: Moderate prostatic enlargement.    MUSCULOSKELETAL: Bilateral  fat-containing inguinal hernias. High-grade degenerative disc changes lower lumbar spine.      Impression    IMPRESSION:   1.  Findings consistent with new bladder outlet obstruction. Prominent dilatation of bladder with moderate bilateral hydronephrosis.  2.  Extensive left-sided perinephric soft tissue stranding likely related to calyceal rupture and urine extravasation.

## 2023-11-11 NOTE — CONSULTS
[facility] CONSULTATION NOTE      Allen BISMARK Grubbs   [address]  [place] [id number]  83 year old  male  Admission Date/Time: 11/10/2023  6:58 PM  Primary Care Provider:  Lilia Vera      HPI:     84yo M presented with urinary retention, L forniceal rupture, WEST with Creatinine 8.     Denies previously needing hernandes or symptoms of retention on arrival, rather just had generalized malaise.    No fevers, chills, nausea, emesis, chest pain, SOB        ALLERGIES/SENSITIVITIES: No Known Allergies    Current inpatient medications reviewed    PHYSICAL EXAM:     General Appearance:   NAD  /59 (BP Location: Right arm)   Pulse 72   Temp 98.1  F (36.7  C) (Oral)   Resp 21   Wt 65.8 kg (145 lb)   SpO2 93%   BMI 23.40 kg/m    Body mass index is 23.4 kg/m .  HEAD, EARS, NOSE, MOUTH, AND THROAT: [place]/Moist  RESPIRATORY: Normal excursion, no accessory muscles, no audible wheeze  CARDIOVASCULAR: NO JVD or visible edema  MUSCULOSKELETAL: Normal ROM observed  NEUROLOGIC: Grossly intact, symmetric fascial CN and voice.  PSYCHIATRIC: Affect appropriate, answers are clear and linear.  ABD: Soft, non-tender. No masses or rebound.  : Hernandes in place draining yellow        CONSULTATION ASSESSMENT AND PLAN:    84yo M presented with urinary retention, L forniceal rupture, WEST with Creatinine 8. Now s/p hernandes catheter insertion.    Retention likely secondary to BPH and constipation    Patient follows urologist Dr. Lee Baumgarten outpatient.    Recommendations:  No acute urologic intervention at this time  Can obtain DWIGHT in 48hrs to confirm resolution of hydronephrosis  Trend BMP, monitor for post-obstructive diuresis  Maintain hernandes catheter through discharge, urology will follow-up outpatient in two weeks for voiding trial  Optimize bladder function by minimizing narcotics, ensuring ambulation to baseline, and bowel regimen for soft daily BM    Thank you for the kind consult and allowing me to  participate in the care of your patient. Feel free to call me with any questions.

## 2023-11-12 LAB
ANION GAP SERPL CALCULATED.3IONS-SCNC: 6 MMOL/L (ref 7–15)
BUN SERPL-MCNC: 26.5 MG/DL (ref 8–23)
CALCIUM SERPL-MCNC: 9.3 MG/DL (ref 8.8–10.2)
CHLORIDE SERPL-SCNC: 108 MMOL/L (ref 98–107)
CREAT SERPL-MCNC: 0.91 MG/DL (ref 0.67–1.17)
DEPRECATED HCO3 PLAS-SCNC: 26 MMOL/L (ref 22–29)
EGFRCR SERPLBLD CKD-EPI 2021: 83 ML/MIN/1.73M2
GLUCOSE SERPL-MCNC: 122 MG/DL (ref 70–99)
POTASSIUM SERPL-SCNC: 4.4 MMOL/L (ref 3.4–5.3)
SODIUM SERPL-SCNC: 140 MMOL/L (ref 135–145)

## 2023-11-12 PROCEDURE — 258N000003 HC RX IP 258 OP 636: Performed by: STUDENT IN AN ORGANIZED HEALTH CARE EDUCATION/TRAINING PROGRAM

## 2023-11-12 PROCEDURE — 82310 ASSAY OF CALCIUM: CPT | Performed by: STUDENT IN AN ORGANIZED HEALTH CARE EDUCATION/TRAINING PROGRAM

## 2023-11-12 PROCEDURE — 250N000013 HC RX MED GY IP 250 OP 250 PS 637: Performed by: STUDENT IN AN ORGANIZED HEALTH CARE EDUCATION/TRAINING PROGRAM

## 2023-11-12 PROCEDURE — 36415 COLL VENOUS BLD VENIPUNCTURE: CPT | Performed by: STUDENT IN AN ORGANIZED HEALTH CARE EDUCATION/TRAINING PROGRAM

## 2023-11-12 PROCEDURE — 120N000001 HC R&B MED SURG/OB

## 2023-11-12 PROCEDURE — 250N000011 HC RX IP 250 OP 636: Mod: JZ | Performed by: STUDENT IN AN ORGANIZED HEALTH CARE EDUCATION/TRAINING PROGRAM

## 2023-11-12 PROCEDURE — 99232 SBSQ HOSP IP/OBS MODERATE 35: CPT | Performed by: STUDENT IN AN ORGANIZED HEALTH CARE EDUCATION/TRAINING PROGRAM

## 2023-11-12 RX ORDER — NALOXONE HYDROCHLORIDE 0.4 MG/ML
0.2 INJECTION, SOLUTION INTRAMUSCULAR; INTRAVENOUS; SUBCUTANEOUS
Status: DISCONTINUED | OUTPATIENT
Start: 2023-11-12 | End: 2023-11-13 | Stop reason: HOSPADM

## 2023-11-12 RX ORDER — METHOCARBAMOL 750 MG/1
750 TABLET, FILM COATED ORAL EVERY 6 HOURS PRN
Status: DISCONTINUED | OUTPATIENT
Start: 2023-11-12 | End: 2023-11-13 | Stop reason: HOSPADM

## 2023-11-12 RX ORDER — NALOXONE HYDROCHLORIDE 0.4 MG/ML
0.4 INJECTION, SOLUTION INTRAMUSCULAR; INTRAVENOUS; SUBCUTANEOUS
Status: DISCONTINUED | OUTPATIENT
Start: 2023-11-12 | End: 2023-11-13 | Stop reason: HOSPADM

## 2023-11-12 RX ADMIN — HEPARIN SODIUM 5000 UNITS: 10000 INJECTION, SOLUTION INTRAVENOUS; SUBCUTANEOUS at 21:32

## 2023-11-12 RX ADMIN — SODIUM CHLORIDE, POTASSIUM CHLORIDE, SODIUM LACTATE AND CALCIUM CHLORIDE: 600; 310; 30; 20 INJECTION, SOLUTION INTRAVENOUS at 03:04

## 2023-11-12 RX ADMIN — POLYETHYLENE GLYCOL 3350 17 G: 17 POWDER, FOR SOLUTION ORAL at 12:59

## 2023-11-12 RX ADMIN — ATORVASTATIN CALCIUM 10 MG: 10 TABLET, FILM COATED ORAL at 07:51

## 2023-11-12 RX ADMIN — ACETAMINOPHEN 650 MG: 325 TABLET ORAL at 11:55

## 2023-11-12 RX ADMIN — METHOCARBAMOL 750 MG: 750 TABLET ORAL at 17:55

## 2023-11-12 RX ADMIN — ACETAMINOPHEN 650 MG: 325 TABLET ORAL at 17:54

## 2023-11-12 RX ADMIN — HEPARIN SODIUM 5000 UNITS: 10000 INJECTION, SOLUTION INTRAVENOUS; SUBCUTANEOUS at 11:55

## 2023-11-12 ASSESSMENT — ACTIVITIES OF DAILY LIVING (ADL)
ADLS_ACUITY_SCORE: 29
ADLS_ACUITY_SCORE: 28
ADLS_ACUITY_SCORE: 29
ADLS_ACUITY_SCORE: 28
ADLS_ACUITY_SCORE: 29

## 2023-11-12 NOTE — PROGRESS NOTES
Chart reviewed. Discussed with MD. OT rec is home with assist. Patient declined to work with PT this morning. MD says family wants TCU    Met with patient, sig other Vero and Vero's daughter Erna. Says they want TCU to help with catheter care. Told her that likely is not a skilled need for insurance to cover TCU and home care would be more appropriate, especially if therapy is not recommending TCU. Patient is now agreeable to work with PT to see if he qualifies for TCU. Notified Violeta with PT who will try to see patient again this afternoon.

## 2023-11-12 NOTE — PROGRESS NOTES
Physical Therapy: Orders received. Chart reviewed and discussed with care team.? Physical Therapy not indicated due to patient declining. Patient expresses frustration with PT. Expresses no concerns with mobility and declines PT.? Defer discharge recommendations to OT.? Will complete orders.     Violeta Leyva, PT  11/12/2023

## 2023-11-12 NOTE — PROGRESS NOTES
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   Bilateral hydronephrosis   - urology consulted, no need for procedure as Castle relieved the obstruction  - holding PTA trazodone as that could be worsening obstruction  - limit opioids  - continue Castle and will need follow up with urology in 2 weeks   - bowel regimen with daily miralax   - discontinue IVF and encourage PO intake   - monitor urine output   - PT/ OT  - SW consulted for dispo planning as family feels unable to assist patient at home, likely will need TCU  - palliative care consulted as noted below     COPD  HLD  - PTA statin, as needed neb (not on any home COPD meds)     Hx sarcoma  - 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  - palliative care consulted for goals of care discussion/ code status     Diet: Regular Diet Adult    DVT Prophylaxis: Heparin SQ  Castle Catheter: PRESENT, indication: Obstruction;Retention  Lines: None     Cardiac Monitoring: None  Code Status: Full Code      Clinically Significant Risk Factors Present on Admission           # Hypercalcemia: corrected calcium is >10.1, will monitor as appropriate    # Hypoalbuminemia: Lowest albumin = 2.8 g/dL at 11/11/2023  8:27 AM, will monitor as appropriate  # Coagulation Defect: INR = 1.33 (Ref range: 0.85 - 1.15) and/or PTT = N/A, will monitor for bleeding                    Disposition Plan discharge in next 1-2 days pending therapy eval     Expected Discharge Date: 11/13/2023      Destination: home with family              Ian Espino DO  Hospitalist Service  [facility]  Securely message with  [employer] (more info)  Text page via [facility] Paging/Directory   ______________________________________________________________________    Interval History   Patient admits to pain this AM. He has intermittent pain yesterday, overall he states it has been tolerable.     Physical Exam   Vital Signs: Temp: 97.8  F (36.6  C) Temp src: Oral BP: (!) 140/70 Pulse: 63   Resp: 20 SpO2: 93 % O2 Device: None (Room air)    Weight: 145 lbs 0 oz    General Appearance: Non-toxic  Respiratory: CTAB, breathing comfortably  Cardiovascular: RRR  GI: Mild distention with no pain  Skin: No rash   Other: Urine in hernandes is yellow, improved from yesterday     Medical Decision Making       45 MINUTES SPENT BY ME on the date of service doing chart review, history, exam, documentation & further activities per the note.      Data   Imaging results reviewed over the past 24 hrs:   No results found for this or any previous visit (from the past 24 hour(s)).

## 2023-11-12 NOTE — PLAN OF CARE
"  Problem: Adult Inpatient Plan of Care  Goal: Plan of Care Review  Description: The Plan of Care Review/Shift note should be completed every shift.  The Outcome Evaluation is a brief statement about your assessment that the patient is improving, declining, or no change.  This information will be displayed automatically on your shift  note.  Outcome: Progressing  Goal: Patient-Specific Goal (Individualized)  Description: You can add care plan individualizations to a care plan. Examples of Individualization might be:  \"Parent requests to be called daily at 9am for status\", \"I have a hard time hearing out of my right ear\", or \"Do not touch me to wake me up as it startles  me\".  Outcome: Progressing  Goal: Absence of Hospital-Acquired Illness or Injury  Outcome: Progressing  Intervention: Identify and Manage Fall Risk  Recent Flowsheet Documentation  Taken 11/12/2023 0357 by Carrie Trotter, RN  Safety Promotion/Fall Prevention:   activity supervised   assistive device/personal items within reach   clutter free environment maintained   nonskid shoes/slippers when out of bed   safety round/check completed  Taken 11/12/2023 0050 by Carrie Trotter, RN  Safety Promotion/Fall Prevention:   activity supervised   assistive device/personal items within reach   clutter free environment maintained   nonskid shoes/slippers when out of bed   safety round/check completed  Intervention: Prevent Skin Injury  Recent Flowsheet Documentation  Taken 11/12/2023 0357 by Carrie Trotter, RN  Body Position: position changed independently  Taken 11/12/2023 0050 by Carrie Trotter, RN  Body Position: position changed independently  Goal: Optimal Comfort and Wellbeing  Outcome: Progressing  Goal: Readiness for Transition of Care  Outcome: Progressing   Goal Outcome Evaluation:         Pt alert with fluctuating mentation. Castle patent  naveen with a pink tint at times. Pt denies any pain. Will monitor.                "

## 2023-11-12 NOTE — PROVIDER NOTIFICATION
Sent vocera text to MD - Dr. Espino as patient having intense  intermittent pain/spasms in abdomen.Requesting PRN medication. Also informed MD earlier that pt. Lost iv access today and refuses to have it replaced.

## 2023-11-12 NOTE — PLAN OF CARE
Patient admitted this evening around 1645  from ED. Patient alert and oriented; a bit forgetful. VSS Patient with indwelling Castle - urine pink tinged but large amount. Patient with mild abdominal discomfort but continued to deny pain. Patient was given Miralax (drank 1/2) able to have  a BM. Patient sleeping at time of this writing.  Problem: Constipation  Goal: Effective Bowel Elimination  Outcome: Progressing     Problem: Adult Inpatient Plan of Care  Goal: Absence of Hospital-Acquired Illness or Injury  Intervention: Identify and Manage Fall Risk  Recent Flowsheet Documentation  Taken 11/11/2023 2018 by Martha Kim, RN  Safety Promotion/Fall Prevention:   activity supervised   assistive device/personal items within reach   clutter free environment maintained   nonskid shoes/slippers when out of bed   safety round/check completed  Taken 11/11/2023 1720 by Martha Kim, RN  Safety Promotion/Fall Prevention:   activity supervised   assistive device/personal items within reach   clutter free environment maintained   nonskid shoes/slippers when out of bed   safety round/check completed     Problem: Risk for Delirium  Goal: Improved Sleep  Outcome: Progressing     Problem: Urinary Retention  Goal: Effective Urinary Elimination  Outcome: Progressing   Goal Outcome Evaluation:

## 2023-11-12 NOTE — PLAN OF CARE
Goal Outcome Evaluation:         Problem: Adult Inpatient Plan of Care  Goal: Absence of Hospital-Acquired Illness or Injury  Intervention: Identify and Manage Fall Risk  Recent Flowsheet Documentation  Taken 11/12/2023 0830 by Chriss Merritt RN  Safety Promotion/Fall Prevention:   activity supervised   assistive device/personal items within reach   clutter free environment maintained   nonskid shoes/slippers when out of bed   safety round/check completed  Intervention: Prevent Skin Injury  Recent Flowsheet Documentation  Taken 11/12/2023 0830 by Chriss Merritt, RN  Body Position: position changed independently  Taken 11/12/2023 0800 by Chriss Merritt RN  Body Position: position changed independently     Patient was not in a good mood this morning refusing lab and cares. As the day progress, patient took meds and upon a visit from family, patient warmed up and was able to cooperate with cares. Patient complained of pain r/t abdomen, PRN acetaminophen was administered.

## 2023-11-13 ENCOUNTER — APPOINTMENT (OUTPATIENT)
Dept: ULTRASOUND IMAGING | Facility: HOSPITAL | Age: 83
DRG: 699 | End: 2023-11-13
Attending: STUDENT IN AN ORGANIZED HEALTH CARE EDUCATION/TRAINING PROGRAM
Payer: COMMERCIAL

## 2023-11-13 ENCOUNTER — APPOINTMENT (OUTPATIENT)
Dept: OCCUPATIONAL THERAPY | Facility: HOSPITAL | Age: 83
DRG: 699 | End: 2023-11-13
Payer: COMMERCIAL

## 2023-11-13 ENCOUNTER — APPOINTMENT (OUTPATIENT)
Dept: PHYSICAL THERAPY | Facility: HOSPITAL | Age: 83
DRG: 699 | End: 2023-11-13
Attending: STUDENT IN AN ORGANIZED HEALTH CARE EDUCATION/TRAINING PROGRAM
Payer: COMMERCIAL

## 2023-11-13 VITALS
HEIGHT: 66 IN | RESPIRATION RATE: 18 BRPM | SYSTOLIC BLOOD PRESSURE: 130 MMHG | BODY MASS INDEX: 23.3 KG/M2 | DIASTOLIC BLOOD PRESSURE: 78 MMHG | HEART RATE: 65 BPM | WEIGHT: 145 LBS | TEMPERATURE: 97.6 F | OXYGEN SATURATION: 94 %

## 2023-11-13 LAB
ALBUMIN SERPL BCG-MCNC: 3 G/DL (ref 3.5–5.2)
ALP SERPL-CCNC: 70 U/L (ref 40–129)
ALT SERPL W P-5'-P-CCNC: 38 U/L (ref 0–70)
ANION GAP SERPL CALCULATED.3IONS-SCNC: 6 MMOL/L (ref 7–15)
AST SERPL W P-5'-P-CCNC: 39 U/L (ref 0–45)
BILIRUB SERPL-MCNC: 0.5 MG/DL
BUN SERPL-MCNC: 18.4 MG/DL (ref 8–23)
CALCIUM SERPL-MCNC: 9 MG/DL (ref 8.8–10.2)
CHLORIDE SERPL-SCNC: 106 MMOL/L (ref 98–107)
CREAT SERPL-MCNC: 0.82 MG/DL (ref 0.67–1.17)
DEPRECATED HCO3 PLAS-SCNC: 27 MMOL/L (ref 22–29)
EGFRCR SERPLBLD CKD-EPI 2021: 87 ML/MIN/1.73M2
GLUCOSE SERPL-MCNC: 101 MG/DL (ref 70–99)
PLATELET # BLD AUTO: 323 10E3/UL (ref 150–450)
POTASSIUM SERPL-SCNC: 4.5 MMOL/L (ref 3.4–5.3)
PROT SERPL-MCNC: 5.5 G/DL (ref 6.4–8.3)
SODIUM SERPL-SCNC: 139 MMOL/L (ref 135–145)

## 2023-11-13 PROCEDURE — 99239 HOSP IP/OBS DSCHRG MGMT >30: CPT | Performed by: STUDENT IN AN ORGANIZED HEALTH CARE EDUCATION/TRAINING PROGRAM

## 2023-11-13 PROCEDURE — 97116 GAIT TRAINING THERAPY: CPT | Mod: GP

## 2023-11-13 PROCEDURE — 250N000013 HC RX MED GY IP 250 OP 250 PS 637: Performed by: STUDENT IN AN ORGANIZED HEALTH CARE EDUCATION/TRAINING PROGRAM

## 2023-11-13 PROCEDURE — 85049 AUTOMATED PLATELET COUNT: CPT | Performed by: STUDENT IN AN ORGANIZED HEALTH CARE EDUCATION/TRAINING PROGRAM

## 2023-11-13 PROCEDURE — 76770 US EXAM ABDO BACK WALL COMP: CPT

## 2023-11-13 PROCEDURE — 99418 PROLNG IP/OBS E/M EA 15 MIN: CPT | Performed by: CLINICAL NURSE SPECIALIST

## 2023-11-13 PROCEDURE — 250N000011 HC RX IP 250 OP 636: Mod: JZ | Performed by: STUDENT IN AN ORGANIZED HEALTH CARE EDUCATION/TRAINING PROGRAM

## 2023-11-13 PROCEDURE — 99223 1ST HOSP IP/OBS HIGH 75: CPT | Performed by: CLINICAL NURSE SPECIALIST

## 2023-11-13 PROCEDURE — 80053 COMPREHEN METABOLIC PANEL: CPT | Performed by: STUDENT IN AN ORGANIZED HEALTH CARE EDUCATION/TRAINING PROGRAM

## 2023-11-13 PROCEDURE — 97161 PT EVAL LOW COMPLEX 20 MIN: CPT | Mod: GP

## 2023-11-13 PROCEDURE — 36415 COLL VENOUS BLD VENIPUNCTURE: CPT | Performed by: STUDENT IN AN ORGANIZED HEALTH CARE EDUCATION/TRAINING PROGRAM

## 2023-11-13 PROCEDURE — 97535 SELF CARE MNGMENT TRAINING: CPT | Mod: GO

## 2023-11-13 RX ORDER — POLYETHYLENE GLYCOL 3350 17 G/17G
17 POWDER, FOR SOLUTION ORAL DAILY
Qty: 510 G | Refills: 1 | Status: SHIPPED | OUTPATIENT
Start: 2023-11-13

## 2023-11-13 RX ORDER — METHOCARBAMOL 750 MG/1
750 TABLET, FILM COATED ORAL EVERY 6 HOURS PRN
Qty: 18 TABLET | Refills: 0 | Status: SHIPPED | OUTPATIENT
Start: 2023-11-13

## 2023-11-13 RX ORDER — ENOXAPARIN SODIUM 100 MG/ML
40 INJECTION SUBCUTANEOUS EVERY 24 HOURS
Status: DISCONTINUED | OUTPATIENT
Start: 2023-11-13 | End: 2023-11-13 | Stop reason: HOSPADM

## 2023-11-13 RX ADMIN — ENOXAPARIN SODIUM 40 MG: 40 INJECTION SUBCUTANEOUS at 08:17

## 2023-11-13 RX ADMIN — POLYETHYLENE GLYCOL 3350 17 G: 17 POWDER, FOR SOLUTION ORAL at 08:19

## 2023-11-13 RX ADMIN — ATORVASTATIN CALCIUM 10 MG: 10 TABLET, FILM COATED ORAL at 08:04

## 2023-11-13 ASSESSMENT — ACTIVITIES OF DAILY LIVING (ADL)
ADLS_ACUITY_SCORE: 29

## 2023-11-13 NOTE — PLAN OF CARE
"  Problem: Adult Inpatient Plan of Care  Goal: Plan of Care Review  Description: The Plan of Care Review/Shift note should be completed every shift.  The Outcome Evaluation is a brief statement about your assessment that the patient is improving, declining, or no change.  This information will be displayed automatically on your shift  note.  Outcome: Progressing  Goal: Patient-Specific Goal (Individualized)  Description: You can add care plan individualizations to a care plan. Examples of Individualization might be:  \"Parent requests to be called daily at 9am for status\", \"I have a hard time hearing out of my right ear\", or \"Do not touch me to wake me up as it startles  me\".  Outcome: Progressing  Goal: Absence of Hospital-Acquired Illness or Injury  Outcome: Progressing  Intervention: Identify and Manage Fall Risk  Recent Flowsheet Documentation  Taken 11/13/2023 0010 by Carrie Trotter, RN  Safety Promotion/Fall Prevention:   activity supervised   assistive device/personal items within reach   clutter free environment maintained   nonskid shoes/slippers when out of bed   safety round/check completed  Intervention: Prevent Skin Injury  Recent Flowsheet Documentation  Taken 11/13/2023 0010 by Carrie Trotter, RN  Body Position: position changed independently  Goal: Optimal Comfort and Wellbeing  Outcome: Progressing  Goal: Readiness for Transition of Care  Outcome: Progressing   Goal Outcome Evaluation:      Pt with intermittent confusion. Pt's VSS and denies pain.  Pt in better spirits tonight, pleasant and cooperative. Pt looking forward to being able to go home. Pt to have palliative consult.                  "

## 2023-11-13 NOTE — PROGRESS NOTES
Physical Therapy Discharge Summary    Reason for therapy discharge:    Discharged to home with home therapy.    Progress towards therapy goal(s). See goals on Care Plan in [facility] electronic health record for goal details.  Goals not met.  Barriers to achieving goals:   discharge from facility.    Therapy recommendation(s):    No further therapy is recommended.

## 2023-11-13 NOTE — PROGRESS NOTES
AVS reviewed with pt, daughter and wife at bedside. Reviewed follow up appts, medications and home care. Castle changed to leg bag at time of discharge. No questions.

## 2023-11-13 NOTE — PROGRESS NOTES
Patient somewhat agitated and frustrated when family here. He had a calm restful evening after that. VSS. Patient initially denied pain and around 1700 c/o intermittent intense pain and spasm in abdomen  Obtained new order for muscle relaxant. Pt.given Tylenol also and achieved relief. Appetite improved but still not adequate.Fluid intake encouraged. Continue to monitor bowel elimination.

## 2023-11-13 NOTE — PLAN OF CARE
Problem: Adult Inpatient Plan of Care  Goal: Absence of Hospital-Acquired Illness or Injury  Intervention: Identify and Manage Fall Risk  Recent Flowsheet Documentation  Taken 11/12/2023 2015 by Martha Kim RN  Safety Promotion/Fall Prevention:   activity supervised   assistive device/personal items within reach   nonskid shoes/slippers when out of bed   safety round/check completed  Taken 11/12/2023 1600 by Martha Kim RN  Safety Promotion/Fall Prevention:   activity supervised   assistive device/personal items within reach   nonskid shoes/slippers when out of bed   safety round/check completed     Problem: Adult Inpatient Plan of Care  Goal: Optimal Comfort and Wellbeing  Outcome: Progressing     Problem: Adult Inpatient Plan of Care  Goal: Optimal Comfort and Wellbeing  Intervention: Monitor Pain and Promote Comfort  Recent Flowsheet Documentation  Taken 11/12/2023 1700 by Martha Kim RN  Pain Management Interventions: (text to MD for orders) other (see comments)     Problem: Risk for Delirium  Goal: Optimal Coping  Intervention: Optimize Psychosocial Adjustment to Delirium  Recent Flowsheet Documentation  Taken 11/12/2023 2015 by Martha Kim RN  Supportive Measures:   active listening utilized   decision-making supported   verbalization of feelings encouraged  Taken 11/12/2023 1600 by Martha Kim RN  Supportive Measures:   active listening utilized   decision-making supported   verbalization of feelings encouraged     Problem: Risk for Delirium  Goal: Improved Attention and Thought Clarity  Outcome: Progressing     Problem: Risk for Delirium  Goal: Improved  Sleep  Outcome: Progressing     Problem: Constipation  Goal: Effective Bowel Elimination  Outcome: Progressing     Problem: Adult Inpatient Plan of Care  Goal: Absence of Hospital-Acquired Illness or Injury  Intervention: Identify and Manage Fall Risk  Recent Flowsheet Documentation  Taken 11/12/2023 2015 by Martha Kim, RN  Safety Promotion/Fall Prevention:   activity supervised   assistive device/personal items within reach   nonskid shoes/slippers when out of bed   safety round/check completed  Taken 11/12/2023 1600 by Martha Kim, RN  Safety Promotion/Fall Prevention:   activity supervised   assistive device/personal items within reach   nonskid shoes/slippers when out of bed   safety round/check completed     Problem: Adult Inpatient Plan of Care  Goal: Optimal Comfort and Wellbeing  Intervention: Monitor Pain and Promote Comfort  Recent Flowsheet Documentation  Taken 11/12/2023 1700 by Martha Kim, RN  Pain Management Interventions: (text to MD for orders) other (see comments)     Problem: Risk for Delirium  Goal: Improved Attention and Thought Clarity  Outcome: Progressing     Problem: Risk for Delirium  Goal: Improved Sleep  Outcome: Progressing     Problem: Constipation  Goal: Effective Bowel Elimination  Outcome: Progressing   Goal Outcome Evaluation:

## 2023-11-13 NOTE — CONSULTS
"Palliative Care Consultation Note  [facility]      Patient: Allen Grubbs  Date of Admission:  11/10/2023    Requesting Clinician / Team: Dr. Espino  Reason for consult: Goals of care  \" history of sarcoma, does not want treatment, address GOC and code status \"     Recommendations & Counseling     GOALS OF CARE:   Difficulty clarifying goals of care due to patient somewhat tangential and not answering questions directly.  He does mix up dates of his medical care since cancer diagnosis and difficult to re-direct.  He is clear that he does not want surgery for his cancer under any circumstance.    Discussed code status in detail, ultimately stated he would not want cardiopulmonary resuscitation or intubation.  Agreed to change code status to DNR/DNI.  SO is supportive of this.  POLST form provided to family.   States he has lived a very full life \"5 times more than most people.\"  He states he is death accepting when his time comes.    He is motivated to try to follow medical recommendations to help prevent constipation.  Wants to continue \"turkey tail\" alternative fungal treatment for his cancer, which he believes is helping.  Palliative care will sign off at this time.  If patient develops symptoms related to serious illness or needing assistance with clarifying goals of care, then could refer to outpatient clinic.  I am concerned about potential cognitive changes and/or behavioral changes, and he may benefit from outpatient work up. Difficult to discern after only meeting him today.   Plan is to discharge to home with spouse and home care services.      ADVANCE CARE PLANNING:  Patient has health care directive-scanned to [employer] and copy placed in paper chart.  Health care agent is [facility] [employer]  POSLT form provided-recommended that they complete with PCP as outpatient as patient is discharging today.   Code status: change to DNR/DNI per patient request    MEDICAL MANAGEMENT: " "  Palliative care is not actively managing symptoms at this time.    We discussed the importance of keeping a regular bowel routing.      Thank you for the consult and allowing us to aid in the care of Allen Grubbs.    These recommendations have been discussed with Dr. Espino.    JENNIFER Evans  Securely message with [employer] (more info)  Text page via [facility] Paging/Directory       Assessment      Allen Grubbs is a 83 year old male with a past medical history of  COPD, HLD, psoriasis, sarcoma (diagnosed at [facility] in 2/2023); admitted for acute urinary retention, L renal calyx rupture who presented on 11/10/23 with acute abdominal pain.  Admitted with acute urinary retention, L renal calyx rupture, WEST.    Urology consulted-\"retention likely secondary to BPH and constipation.\" No procedure or surgery recommendation.  Castle catheter in place.  Constipation now resolved.     Pertinent chart review regarding cancer dx-  Per [facility] note 4/20/22 Dr. 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 This lesion is surgically resectable would require right nephrectomy possible patch resection and reconstruction of the IVC. Despite his age Mr. Grubbs is extremely high function with limited comorbidities. Feel it is not unreasonable to consider surgical resection at this time. Patient would like some time to consider his options. He will contact us if he wishes to proceed with surgical resection.\"     Per PCP clinic note 6/1/23, \"In the interim since I saw him last, he was diagnosed with a liposarcoma.  When I saw him for his physical in January 2022, he had hematuria on his problem list, but could not recall any workup or follow-up in relation to this.  UA showed continued hematuria, and CT urogram showed a mass around the right kidney.  Patient was referred to Urology, but at some point ended up at [facility].  He " "was not happy with his care there and expresses suspicion that everyone wanted to \"take his money\" and were only interested in charging for their services.  He saw a surgeon who did recommend resection of the mass.  His oncologist at [facility] is named Walt Courtney and recommended follow-up in 1 year if he did not pursue surgery.  That would have been in April.  Patient says that he is not interested in pursuing any more treatment for this issue as he currently feels well.  Discussed today that this could be life-limiting and that he does not have any other life-limiting conditions currently.  He understands and continues to decline further follow-up.  He describes how many things in his life that he has experienced and accomplished, and relates that he is \"not afraid of dying\".\"    His abdominal CT on 11/6/23 showed.  \"No significant change in the biopsy-proven right retroperitoneal malignancy within limitation in difference in modality compared to MRI 03/14/2022.\"    Today, the patient was seen for:  Goals of care and initial palliative care visit     Palliative Care Summary:   Met with patient, significant other Vero, and Vero's daughter.   Introduced the role of palliative care as an interdisciplinary team that cares for patients with serious illness to help support symptom management, communication, coping for patients and their families as well as support with medical decision making.    Prognosis, Goals, & Planning:    Functional Status just prior to this current hospitalization:  ECOG1 (Restricted in physically strenuous activity but ambulatory and able to carry out work of a light or sedentary nature)    Code Status was addressed today:   Yes, We discussed potential risks and rationale of attempting cardiac resuscitation, intubation, and mechanical ventilation.  We also discussed probability of survival as well as quality of life implications.  Based on this discussion, patient or surrogate " response/decision: DNR/DNI      Patient's decision making preferences: shared with support from loved ones        Patient has decision-making capacity today for complex decisions:Questionable            Coping, Meaning, & Spirituality:   Mood, coping, and/or meaning in the context of serious illness were addressed today: Yes    Social:   Living situation:lives with significant other/spouse  Important relationships/caregivers:significant other Vero of 30+ years.  Vero has 1 daughter and 2 sons that Don is close with.  He has 3 sons from a previous marriage that are reportedly somewhat estranged.    Occupation:     Medications:  I have reviewed this patient's medication profile and medications from this hospitalization.     ROS:  Comprehensive ROS is reviewed and is negative except as here & per HPI: Patient denies pain or shortness of breath.  No nausea or vomiting.  Eating hamburger during our conversation.      Physical Exam   Vital Signs with Ranges  Temp:  [97  F (36.1  C)-98.5  F (36.9  C)] 97.6  F (36.4  C)  Pulse:  [57-75] 65  Resp:  [17-20] 18  BP: (116-142)/(53-78) 130/78  SpO2:  [93 %-97 %] 94 %  145 lbs 0 oz    PHYSICAL EXAM:  Constitutional: healthy, alert, and no distress   Psychiatric: concentration poor, inattentive, and tangential  NEURO: Oriented X 3, forgetful    Data reviewed:  Results for orders placed or performed during the hospital encounter of 11/10/23 (from the past 24 hour(s))   Platelet count   Result Value Ref Range    Platelet Count 323 150 - 450 10e3/uL   Comprehensive metabolic panel   Result Value Ref Range    Sodium 139 135 - 145 mmol/L    Potassium 4.5 3.4 - 5.3 mmol/L    Carbon Dioxide (CO2) 27 22 - 29 mmol/L    Anion Gap 6 (L) 7 - 15 mmol/L    Urea Nitrogen 18.4 8.0 - 23.0 mg/dL    Creatinine 0.82 0.67 - 1.17 mg/dL    GFR Estimate 87 >60 mL/min/1.73m2    Calcium 9.0 8.8 - 10.2 mg/dL    Chloride 106 98 - 107 mmol/L    Glucose 101 (H) 70 - 99 mg/dL    Alkaline  Phosphatase 70 40 - 129 U/L    AST 39 0 - 45 U/L    ALT 38 0 - 70 U/L    Protein Total 5.5 (L) 6.4 - 8.3 g/dL    Albumin 3.0 (L) 3.5 - 5.2 g/dL    Bilirubin Total 0.5 <=1.2 mg/dL   US Renal Complete Non-Vascular    Narrative    EXAM: US RENAL COMPLETE NON-VASCULAR  LOCATION: [facility]  DATE: 11/13/2023    INDICATION: Reassess hydronephrosis.  COMPARISON: 11/10/2023 CT AP  TECHNIQUE: Routine Bilateral Renal and Bladder Ultrasound.    FINDINGS:    RIGHT KIDNEY: 11.5 x 5.5 x 3.7 cm. Normal size and echogenicity. Hydronephrosis has decreased which is now only minimal. Small benign cyst requires no follow-up. No solid mass.    LEFT KIDNEY: 11.1 x 5.4 x 4.6 cm. Normal size and echogenicity. Hydronephrosis has decreased which is now minimal. Small benign cyst requires no follow-up. No solid mass.     BLADDER: Bladder is now completely decompressed status post placement of a well-positioned Castle catheter.      Impression    IMPRESSION: Hydronephrosis has nearly completely resolved status post decompression of the bladder by a well-positioned Castle catheter.         110 MINUTES SPENT BY ME on the date of service doing chart review, history, exam, documentation & further activities per the note.

## 2023-11-13 NOTE — PLAN OF CARE
Problem: Risk for Delirium  Goal: Improved Attention and Thought Clarity  Outcome: Progressing  Intervention: Maximize Cognitive Function  Recent Flowsheet Documentation  Taken 11/13/2023 0900 by Jason Mccarty RN  Sensory Stimulation Regulation: quiet environment promoted  Reorientation Measures: clock in view     Problem: Urinary Retention  Goal: Effective Urinary Elimination  Outcome: Progressing     Problem: Adult Inpatient Plan of Care  Goal: Optimal Comfort and Wellbeing  Outcome: Adequate for Care Transition     Problem: Risk for Delirium  Goal: Improved Sleep  Outcome: Adequate for Care Transition     Problem: Adult Inpatient Plan of Care  Goal: Absence of Hospital-Acquired Illness or Injury  Intervention: Prevent Skin Injury  Recent Flowsheet Documentation  Taken 11/13/2023 0900 by Jason Mccarty RN  Body Position: position changed independently  Intervention: Prevent Infection  Recent Flowsheet Documentation  Taken 11/13/2023 0900 by Jason Mccarty RN  Infection Prevention:   hand hygiene promoted   rest/sleep promoted     Problem: Risk for Delirium  Goal: Improved Behavioral Control  Intervention: Minimize Safety Risk  Recent Flowsheet Documentation  Taken 11/13/2023 0900 by Jason Mccarty RN  Communication Enhancement Strategies:   call light answered in person   extra time allowed for response   Goal Outcome Evaluation:       Patient is A/O x4. VSS. Patient denies pain. He expressed readiness to discharge home and has inquired about care plan for discharge. Nursing staff informed patient that discharge is pending PT eval, US of kidney to determine renal health, and acquisition of necessary home care services. Care Management notified and checked in with patient's partner and daughter who are at bedside.

## 2023-11-13 NOTE — PROGRESS NOTES
"   11/13/23 9072   Appointment Info   Signing Clinician's Name / Credentials (PT) Mavis Acosta, PT, DPT   Living Environment   People in Home significant other  (girlfriend)   Current Living Arrangements house   Home Accessibility stairs within home   Number of Stairs, Within Home, Primary six   Stair Railings, Within Home, Primary railings on both sides of stairs   Self-Care   Equipment Currently Used at Home none   Fall history within last six months no   Activity/Exercise/Self-Care Comment pt reported independent with mobility at home   General Information   Onset of Illness/Injury or Date of Surgery 11/10/23   Referring Physician Ian Espino DO   Patient/Family Therapy Goals Statement (PT) Return to home   Pertinent History of Current Problem (include personal factors and/or comorbidities that impact the POC) Per Chart Review -\"83M presents with acute abdominal pain; pmhx includes COPD, HLD, psoriasis; admitted for acute urinary retention, L renal calyx rupture\"   Existing Precautions/Restrictions no known precautions/restrictions   Range of Motion (ROM)   Range of Motion ROM is WFL   Strength (Manual Muscle Testing)   Strength (Manual Muscle Testing) strength is WFL   Bed Mobility   Bed Mobility supine-sit   Supine-Sit [place] (Bed Mobility) supervision;verbal cues;contact guard   Transfers   Transfers sit-stand transfer   Maintains Weight-bearing Status (Transfers) able to maintain   Sit-Stand Transfer   Sit-Stand [place] (Transfers) supervision;verbal cues;contact guard   Assistive Device (Sit-Stand Transfers) walker, front-wheeled   Gait/Stairs (Locomotion)   [place] Level (Gait) contact guard   Assistive Device (Gait) walker, front-wheeled   Distance in Feet (Gait) 5   Pattern (Gait) step-through   Deviations/Abnormal Patterns (Gait) gait speed decreased   Maintains Weight-bearing Status (Gait) able to maintain   Clinical Impression   Criteria for Skilled Therapeutic Intervention Yes, " treatment indicated   PT Diagnosis (PT) Impaired Functional Mobility   Influenced by the following impairments impaired balance   Functional limitations due to impairments stairs, transfers, gait   Clinical Presentation (PT Evaluation Complexity) stable   Clinical Presentation Rationale pt presents as medically diagnosed   Clinical Decision Making (Complexity) low complexity   Planned Therapy Interventions (PT) balance training;home exercise program;gait training;ROM (range of motion);stair training;strengthening;transfer training   Risk & Benefits of therapy have been explained evaluation/treatment results reviewed;patient   PT Total Evaluation Time   PT Eval, Low Complexity Minutes (04586) 10   Physical Therapy Goals   PT Frequency Daily   PT Predicted Duration/Target Date for Goal Attainment 11/15/23   PT Goals Stairs;Gait   PT: Gait Modified independent;Greater than 200 feet   PT: Stairs Supervision/stand-by assist;6 stairs;Rail on both sides   Interventions   Interventions Quick Adds Gait Training;Therapeutic Activity   Therapeutic Activity   Treatment Detail/Skilled Intervention Sit to/from stand & Sit to supine x1: cueing for safety, SBA   Gait Training   Gait Training Minutes (20497) 8   Symptoms Noted During/After Treatment (Gait Training) fatigue   Treatment Detail/Skilled Intervention Cueing for safety/technique, 150 with FWW & 150' with not AD; Mild increase in lateral shifting noted without AD, but still steady with decreased gait speed. Increased impulsivity noted, cueing for slow & controlled movement   Distance in Feet 300   [place] Level (Gait Training) stand-by assist   Physical Assistance Level (Gait Training) supervision;verbal cues   Weight Bearing (Gait Training) full weight-bearing   Assistive Device (Gait Training) rolling walker  (150 with FWW, 150 with no AD)   Pattern Analysis (Gait Training) swing-through gait   Gait Analysis Deviations decreased step length   Impairments (Gait  Analysis/Training) balance impaired   PT Discharge Planning   PT Plan Stairs, HEP balance   PT Discharge Recommendation (DC Rec) home with assist   PT Rationale for DC Rec home with assist from girlfriend as needed; pt is SBA for gait 300', sit to/from stand   PT Brief overview of current status SBA gait 300'; sit to/from stand   PT Equipment Needed at Discharge   (none)   Total Session Time   Timed Code Treatment Minutes 8   Total Session Time (sum of timed and untimed services) 18

## 2023-11-13 NOTE — DISCHARGE SUMMARY
[facility]  Hospitalist Discharge Summary      Date of Admission:  11/10/2023  Date of Discharge:  11/13/2023  4:31 PM  Discharging Provider: Ian Espino DO  Discharge Service: Hospitalist Service    Discharge Diagnoses   Principal Problem:    Urine extravasation  Active Problems:    Bladder outlet obstruction    WEST (acute kidney injury) (H24)    Acute renal failure, unspecified acute renal failure type (H24)      Clinically Significant Risk Factors          Follow-ups Needed After Discharge   Follow-up Appointments     Follow-up and recommended labs and tests       Follow up with primary care provider, Lilia Vera, within 7 days for   hospital follow- up.  The following labs/tests are recommended: BMP.  Follow up with urology in 2 weeks for Castle            Discharge Disposition   Discharged to home  Condition at discharge: Stable    Hospital Course   Principal Problem:    Urine extravasation  Active Problems:    Bladder outlet obstruction    WEST (acute kidney injury) (H24)    Acute renal failure, unspecified acute renal failure type (H24)    WEST  Bladder outlet obstruction with L renal calyx rupture   Bilateral hydronephrosis   - urology consulted, no need for procedure as Castle relieved the obstruction  - repeat ultrasound with resolution in hydronephrosis  - follow up with urology as outlined  - hold trazodone     Consultations This Hospital Stay   UROLOGY IP CONSULT  PHYSICAL THERAPY ADULT IP CONSULT  OCCUPATIONAL THERAPY ADULT IP CONSULT  CARE MANAGEMENT / SOCIAL WORK IP CONSULT  PALLIATIVE CARE ADULT IP CONSULT  PHYSICAL THERAPY ADULT IP CONSULT    Code Status   No CPR- Do NOT Intubate    Time Spent on this Encounter   IIan DO, personally saw the patient today and spent greater than 30 minutes discharging this patient.       DO MANISHA Smith [address] [address]  Phone: [phone]  Fax:  [phone]  ______________________________________________________________________    Physical Exam   Vital Signs: Temp: 97.6  F (36.4  C) Temp src: Oral BP: 130/78 Pulse: 65   Resp: 18 SpO2: 94 % O2 Device: None (Room air)    Weight: 145 lbs 0 oz    General Appearance:  Non-toxic  Respiratory: CTAB, breathing comfortably  Cardiovascular: RRR  GI: Mild distention with no pain  Skin: No rash   Other:  Urine in hernandes is yellow, improved from yesterday        Primary Care Physician   Lilia Vera    Discharge Orders      Home Care Referral      Primary Care - Care Coordination Referral      Reason for your hospital stay    Bilateral hydronephrosis, urinary retention, kidney rupture     Follow-up and recommended labs and tests     Follow up with primary care provider, Lilia Vera, within 7 days for hospital follow- up.  The following labs/tests are recommended: BMP.  Follow up with urology in 2 weeks for Hernandes     Activity    Your activity upon discharge: activity as tolerated     Diet    Follow this diet upon discharge: Orders Placed This Encounter      Regular Diet Adult       Significant Results and Procedures   Results for orders placed or performed during the hospital encounter of 11/10/23   CT Abdomen Pelvis w/o Contrast    Narrative    EXAM: CT ABDOMEN PELVIS W/O CONTRAST  LOCATION: [facility]  DATE: 11/10/2023    INDICATION: diffuse abd pain, ongoing constripation, had CT 11 6 23 at [facility].  COMPARISON: None.  TECHNIQUE: CT scan of the abdomen and pelvis was performed without IV contrast. Multiplanar reformats were obtained. Dose reduction techniques were used.  CONTRAST: None.    FINDINGS:   LOWER CHEST: Small left pleural effusion new compared to prior study.    HEPATOBILIARY: Cholelithiasis unchanged, no definite acute inflammatory process evident.    PANCREAS: Normal.    SPLEEN: Normal.    ADRENAL GLANDS: Normal.  The lobulated 4 cm mass situated between the right adrenal  gland and right kidney is unchanged.    KIDNEYS/BLADDER: There is now prominent dilatation of bladder with estimated volume of nearly 800 mL. There is new modest bilateral hydronephrosis/hydroureter. Extensive retroperitoneal soft tissue stranding and perinephric edema about the left kidney   likely relates to calyceal disruption due to back pressure. Faint dilute contrast material present within collecting system and bladder. Similar faint contrast is seen within the left retroperitoneal soft tissues consistent with urine extravasation.  Findings consistent with bladder outlet obstruction.    BOWEL: No obstruction or inflammatory change.    LYMPH NODES: Normal.    VASCULATURE: Unremarkable.    PELVIC ORGANS: Moderate prostatic enlargement.    MUSCULOSKELETAL: Bilateral fat-containing inguinal hernias. High-grade degenerative disc changes lower lumbar spine.      Impression    IMPRESSION:   1.  Findings consistent with new bladder outlet obstruction. Prominent dilatation of bladder with moderate bilateral hydronephrosis.  2.  Extensive left-sided perinephric soft tissue stranding likely related to calyceal rupture and urine extravasation.     US Renal Complete Non-Vascular    Narrative    EXAM: US RENAL COMPLETE NON-VASCULAR  LOCATION: [facility]  DATE: 11/13/2023    INDICATION: Reassess hydronephrosis.  COMPARISON: 11/10/2023 CT AP  TECHNIQUE: Routine Bilateral Renal and Bladder Ultrasound.    FINDINGS:    RIGHT KIDNEY: 11.5 x 5.5 x 3.7 cm. Normal size and echogenicity. Hydronephrosis has decreased which is now only minimal. Small benign cyst requires no follow-up. No solid mass.    LEFT KIDNEY: 11.1 x 5.4 x 4.6 cm. Normal size and echogenicity. Hydronephrosis has decreased which is now minimal. Small benign cyst requires no follow-up. No solid mass.     BLADDER: Bladder is now completely decompressed status post placement of a well-positioned Castle catheter.      Impression    IMPRESSION:  Hydronephrosis has nearly completely resolved status post decompression of the bladder by a well-positioned Castle catheter.       Discharge Medications   Discharge Medication List as of 11/13/2023  3:16 PM        START taking these medications    Details   methocarbamol (ROBAXIN) 750 MG tablet Take 1 tablet (750 mg) by mouth every 6 hours as needed for muscle spasms, Disp-18 tablet, R-0, E-Prescribe      polyethylene glycol (MIRALAX) 17 GM/Dose powder Take 17 g by mouth daily, Disp-510 g, R-1, E-Prescribe           CONTINUE these medications which have NOT CHANGED    Details   lovastatin (MEVACOR) 40 MG tablet TAKE 1 TABLET(40 MG) BY MOUTH AT BEDTIME, Disp-90 tablet, R-3, E-Prescribe           STOP taking these medications       traZODone (DESYREL) 50 MG tablet Comments:   Reason for Stopping:             Allergies   No Known Allergies

## 2023-11-13 NOTE — PLAN OF CARE
"  Problem: Adult Inpatient Plan of Care  Goal: Plan of Care Review  Description: The Plan of Care Review/Shift note should be completed every shift.  The Outcome Evaluation is a brief statement about your assessment that the patient is improving, declining, or no change.  This information will be displayed automatically on your shift  note.  Outcome: Adequate for Care Transition  Goal: Patient-Specific Goal (Individualized)  Description: You can add care plan individualizations to a care plan. Examples of Individualization might be:  \"Parent requests to be called daily at 9am for status\", \"I have a hard time hearing out of my right ear\", or \"Do not touch me to wake me up as it startles  me\".  Outcome: Adequate for Care Transition  Goal: Absence of Hospital-Acquired Illness or Injury  Outcome: Adequate for Care Transition  Goal: Readiness for Transition of Care  Outcome: Adequate for Care Transition     Problem: Risk for Delirium  Goal: Optimal Coping  Outcome: Adequate for Care Transition  Goal: Improved Behavioral Control  Outcome: Adequate for Care Transition  Goal: Improved Attention and Thought Clarity  Outcome: Adequate for Care Transition     Problem: Urinary Retention  Goal: Effective Urinary Elimination  Outcome: Adequate for Care Transition     Problem: Constipation  Goal: Effective Bowel Elimination  Outcome: Adequate for Care Transition   Goal Outcome Evaluation:                        "

## 2023-11-14 ENCOUNTER — HOSPITAL ENCOUNTER (EMERGENCY)
Facility: HOSPITAL | Age: 83
Discharge: HOME | End: 2023-11-14
Attending: EMERGENCY MEDICINE | Admitting: EMERGENCY MEDICINE
Payer: COMMERCIAL

## 2023-11-14 ENCOUNTER — APPOINTMENT (OUTPATIENT)
Dept: CT IMAGING | Facility: HOSPITAL | Age: 83
End: 2023-11-14
Attending: EMERGENCY MEDICINE
Payer: COMMERCIAL

## 2023-11-14 VITALS
WEIGHT: 145 LBS | BODY MASS INDEX: 23.41 KG/M2 | HEART RATE: 62 BPM | RESPIRATION RATE: 22 BRPM | TEMPERATURE: 98 F | DIASTOLIC BLOOD PRESSURE: 55 MMHG | SYSTOLIC BLOOD PRESSURE: 158 MMHG | OXYGEN SATURATION: 98 %

## 2023-11-14 DIAGNOSIS — R10.84 GENERALIZED ABDOMINAL PAIN: ICD-10-CM

## 2023-11-14 DIAGNOSIS — K59.00 CONSTIPATION, UNSPECIFIED CONSTIPATION TYPE: ICD-10-CM

## 2023-11-14 LAB
ALBUMIN SERPL BCG-MCNC: 3.8 G/DL (ref 3.5–5.2)
ALP SERPL-CCNC: 89 U/L (ref 40–150)
ALT SERPL W P-5'-P-CCNC: 33 U/L (ref 0–70)
ANION GAP SERPL CALCULATED.3IONS-SCNC: 14 MMOL/L (ref 7–15)
AST SERPL W P-5'-P-CCNC: 22 U/L (ref 0–45)
BASOPHILS # BLD AUTO: 0.1 10E3/UL (ref 0–0.2)
BASOPHILS NFR BLD AUTO: 1 %
BILIRUB DIRECT SERPL-MCNC: <0.2 MG/DL (ref 0–0.3)
BILIRUB SERPL-MCNC: 0.4 MG/DL
BUN SERPL-MCNC: 14.8 MG/DL (ref 8–23)
CALCIUM SERPL-MCNC: 10 MG/DL (ref 8.8–10.2)
CHLORIDE SERPL-SCNC: 100 MMOL/L (ref 98–107)
CREAT SERPL-MCNC: 0.9 MG/DL (ref 0.67–1.17)
DEPRECATED HCO3 PLAS-SCNC: 24 MMOL/L (ref 22–29)
EGFRCR SERPLBLD CKD-EPI 2021: 84 ML/MIN/1.73M2
EOSINOPHIL # BLD AUTO: 0.1 10E3/UL (ref 0–0.7)
EOSINOPHIL NFR BLD AUTO: 1 %
ERYTHROCYTE [DISTWIDTH] IN BLOOD BY AUTOMATED COUNT: 14 % (ref 10–15)
GLUCOSE SERPL-MCNC: 163 MG/DL (ref 70–99)
HCT VFR BLD AUTO: 47.9 % (ref 40–53)
HGB BLD-MCNC: 15.6 G/DL (ref 13.3–17.7)
IMM GRANULOCYTES # BLD: 0.1 10E3/UL
IMM GRANULOCYTES NFR BLD: 1 %
LIPASE SERPL-CCNC: 60 U/L (ref 13–60)
LYMPHOCYTES # BLD AUTO: 1.1 10E3/UL (ref 0.8–5.3)
LYMPHOCYTES NFR BLD AUTO: 9 %
MCH RBC QN AUTO: 27.5 PG (ref 26.5–33)
MCHC RBC AUTO-ENTMCNC: 32.6 G/DL (ref 31.5–36.5)
MCV RBC AUTO: 84 FL (ref 78–100)
MONOCYTES # BLD AUTO: 0.9 10E3/UL (ref 0–1.3)
MONOCYTES NFR BLD AUTO: 8 %
NEUTROPHILS # BLD AUTO: 9.8 10E3/UL (ref 1.6–8.3)
NEUTROPHILS NFR BLD AUTO: 80 %
NRBC # BLD AUTO: 0 10E3/UL
NRBC BLD AUTO-RTO: 0 /100
PLATELET # BLD AUTO: 483 10E3/UL (ref 150–450)
POTASSIUM SERPL-SCNC: 4.4 MMOL/L (ref 3.4–5.3)
PROT SERPL-MCNC: 6.9 G/DL (ref 6.4–8.3)
RBC # BLD AUTO: 5.68 10E6/UL (ref 4.4–5.9)
SODIUM SERPL-SCNC: 138 MMOL/L (ref 135–145)
TROPONIN T SERPL HS-MCNC: 10 NG/L
WBC # BLD AUTO: 12 10E3/UL (ref 4–11)

## 2023-11-14 PROCEDURE — 250N000011 HC RX IP 250 OP 636: Mod: JZ | Performed by: EMERGENCY MEDICINE

## 2023-11-14 PROCEDURE — 250N000013 HC RX MED GY IP 250 OP 250 PS 637: Performed by: EMERGENCY MEDICINE

## 2023-11-14 PROCEDURE — 74177 CT ABD & PELVIS W/CONTRAST: CPT

## 2023-11-14 PROCEDURE — 83690 ASSAY OF LIPASE: CPT | Performed by: EMERGENCY MEDICINE

## 2023-11-14 PROCEDURE — 96375 TX/PRO/DX INJ NEW DRUG ADDON: CPT

## 2023-11-14 PROCEDURE — 93005 ELECTROCARDIOGRAM TRACING: CPT | Performed by: STUDENT IN AN ORGANIZED HEALTH CARE EDUCATION/TRAINING PROGRAM

## 2023-11-14 PROCEDURE — 36415 COLL VENOUS BLD VENIPUNCTURE: CPT | Performed by: EMERGENCY MEDICINE

## 2023-11-14 PROCEDURE — 85025 COMPLETE CBC W/AUTO DIFF WBC: CPT | Performed by: EMERGENCY MEDICINE

## 2023-11-14 PROCEDURE — 84484 ASSAY OF TROPONIN QUANT: CPT | Performed by: EMERGENCY MEDICINE

## 2023-11-14 PROCEDURE — 96374 THER/PROPH/DIAG INJ IV PUSH: CPT | Mod: 59

## 2023-11-14 PROCEDURE — 80053 COMPREHEN METABOLIC PANEL: CPT | Performed by: EMERGENCY MEDICINE

## 2023-11-14 PROCEDURE — 93005 ELECTROCARDIOGRAM TRACING: CPT | Performed by: EMERGENCY MEDICINE

## 2023-11-14 PROCEDURE — 99285 EMERGENCY DEPT VISIT HI MDM: CPT | Mod: 25

## 2023-11-14 RX ORDER — ONDANSETRON 2 MG/ML
4 INJECTION INTRAMUSCULAR; INTRAVENOUS ONCE
Status: COMPLETED | OUTPATIENT
Start: 2023-11-14 | End: 2023-11-14

## 2023-11-14 RX ORDER — MORPHINE SULFATE 4 MG/ML
4 INJECTION, SOLUTION INTRAMUSCULAR; INTRAVENOUS ONCE
Status: COMPLETED | OUTPATIENT
Start: 2023-11-14 | End: 2023-11-14

## 2023-11-14 RX ORDER — AMOXICILLIN 250 MG
1 CAPSULE ORAL 2 TIMES DAILY PRN
Qty: 30 TABLET | Refills: 0 | Status: SHIPPED | OUTPATIENT
Start: 2023-11-14

## 2023-11-14 RX ORDER — HYDROMORPHONE HYDROCHLORIDE 1 MG/ML
0.5 INJECTION, SOLUTION INTRAMUSCULAR; INTRAVENOUS; SUBCUTANEOUS ONCE
Status: COMPLETED | OUTPATIENT
Start: 2023-11-14 | End: 2023-11-14

## 2023-11-14 RX ORDER — IOPAMIDOL 755 MG/ML
71 INJECTION, SOLUTION INTRAVASCULAR ONCE
Status: COMPLETED | OUTPATIENT
Start: 2023-11-14 | End: 2023-11-14

## 2023-11-14 RX ADMIN — ONDANSETRON 4 MG: 2 INJECTION INTRAMUSCULAR; INTRAVENOUS at 18:08

## 2023-11-14 RX ADMIN — HYDROMORPHONE HYDROCHLORIDE 0.5 MG: 1 INJECTION, SOLUTION INTRAMUSCULAR; INTRAVENOUS; SUBCUTANEOUS at 19:57

## 2023-11-14 RX ADMIN — IOPAMIDOL 71 ML: 755 INJECTION, SOLUTION INTRAVENOUS at 19:32

## 2023-11-14 RX ADMIN — MORPHINE SULFATE 4 MG: 4 INJECTION, SOLUTION INTRAMUSCULAR; INTRAVENOUS at 18:57

## 2023-11-14 RX ADMIN — FAMOTIDINE 20 MG: 10 INJECTION, SOLUTION INTRAVENOUS at 17:32

## 2023-11-14 RX ADMIN — MINERAL OIL 226 ML: 1 OIL ORAL at 21:55

## 2023-11-14 ASSESSMENT — ACTIVITIES OF DAILY LIVING (ADL)
ADLS_ACUITY_SCORE: 35
ADLS_ACUITY_SCORE: 35

## 2023-11-14 NOTE — ED TRIAGE NOTES
Patient arrives by private car for evaluation of substernal CP 9/10 and bilateral upper abdominal pain since noon today. Also endorses NANI Meza in triage, 39-45. EKG done.
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
## 2023-11-15 ENCOUNTER — TELEPHONE (OUTPATIENT)
Dept: FAMILY MEDICINE | Facility: CLINIC | Age: 83
End: 2023-11-15

## 2023-11-15 NOTE — ED NOTES
"O2 sat alarming in high 80\"s frequently. Clip is not secure on finger. When hand steady, O2 sat 100 RA  "

## 2023-11-15 NOTE — TELEPHONE ENCOUNTER
Order/Referral Request    Who is requesting: [place] with [facility]     Orders being requested: skilled nursing once a week for 3 weeks, one ever other week for 6 weeks, 3 PRN visits.  Requesting orders to change hernandes catheter to silicone for next week if possible. (has latex one right now)    Requesting orders to manage constipation. Patient was sent home with miralax, bowel activity is diminished per nurse, still complaining of abdominal pain.   Patient is taking turkey mushroom Mycelium capsules (helps with micro biome). (He was told to stop taking the turkey tail and the mushroom capsules.)    Reason service is needed/diagnosis: manage hernandes, manage cancer of kidney's,     When are orders needed by: ASAP    Has this been discussed with Provider: No    Does patient have an appointment scheduled?: No    Where to send orders:  verbal call back    Could we send this information to you [facility] or would you prefer to receive a phone call?:   Patient would prefer a phone call   Okay to leave a detailed message?: Yes at Cell number on file:    Telephone Information:    [phone]

## 2023-11-16 ENCOUNTER — APPOINTMENT (OUTPATIENT)
Dept: CT IMAGING | Facility: CLINIC | Age: 83
End: 2023-11-16
Attending: EMERGENCY MEDICINE
Payer: COMMERCIAL

## 2023-11-16 ENCOUNTER — HOSPITAL ENCOUNTER (EMERGENCY)
Facility: CLINIC | Age: 83
Discharge: HOME | End: 2023-11-16
Attending: EMERGENCY MEDICINE | Admitting: EMERGENCY MEDICINE
Payer: COMMERCIAL

## 2023-11-16 VITALS
DIASTOLIC BLOOD PRESSURE: 79 MMHG | OXYGEN SATURATION: 98 % | TEMPERATURE: 99.8 F | RESPIRATION RATE: 16 BRPM | HEART RATE: 84 BPM | SYSTOLIC BLOOD PRESSURE: 126 MMHG

## 2023-11-16 DIAGNOSIS — K59.00 CONSTIPATION, UNSPECIFIED CONSTIPATION TYPE: ICD-10-CM

## 2023-11-16 DIAGNOSIS — N28.89 PARARENAL URINOMA: ICD-10-CM

## 2023-11-16 LAB
ALBUMIN SERPL BCG-MCNC: 3.4 G/DL (ref 3.5–5.2)
ALP SERPL-CCNC: 90 U/L (ref 40–150)
ALT SERPL W P-5'-P-CCNC: 22 U/L (ref 0–70)
ANION GAP SERPL CALCULATED.3IONS-SCNC: 9 MMOL/L (ref 7–15)
AST SERPL W P-5'-P-CCNC: 30 U/L (ref 0–45)
BASOPHILS # BLD AUTO: 0.1 10E3/UL (ref 0–0.2)
BASOPHILS NFR BLD AUTO: 0 %
BILIRUB SERPL-MCNC: 0.6 MG/DL
BUN SERPL-MCNC: 10.3 MG/DL (ref 8–23)
CALCIUM SERPL-MCNC: 9.5 MG/DL (ref 8.8–10.2)
CHLORIDE SERPL-SCNC: 100 MMOL/L (ref 98–107)
CREAT SERPL-MCNC: 0.8 MG/DL (ref 0.67–1.17)
DEPRECATED HCO3 PLAS-SCNC: 24 MMOL/L (ref 22–29)
EGFRCR SERPLBLD CKD-EPI 2021: 87 ML/MIN/1.73M2
EOSINOPHIL # BLD AUTO: 0.1 10E3/UL (ref 0–0.7)
EOSINOPHIL NFR BLD AUTO: 0 %
ERYTHROCYTE [DISTWIDTH] IN BLOOD BY AUTOMATED COUNT: 14.4 % (ref 10–15)
GLUCOSE SERPL-MCNC: 105 MG/DL (ref 70–99)
HCT VFR BLD AUTO: 47.7 % (ref 40–53)
HGB BLD-MCNC: 15.6 G/DL (ref 13.3–17.7)
IMM GRANULOCYTES # BLD: 0.2 10E3/UL
IMM GRANULOCYTES NFR BLD: 1 %
LYMPHOCYTES # BLD AUTO: 1.1 10E3/UL (ref 0.8–5.3)
LYMPHOCYTES NFR BLD AUTO: 7 %
MCH RBC QN AUTO: 27.8 PG (ref 26.5–33)
MCHC RBC AUTO-ENTMCNC: 32.7 G/DL (ref 31.5–36.5)
MCV RBC AUTO: 85 FL (ref 78–100)
MONOCYTES # BLD AUTO: 1.5 10E3/UL (ref 0–1.3)
MONOCYTES NFR BLD AUTO: 9 %
NEUTROPHILS # BLD AUTO: 13.2 10E3/UL (ref 1.6–8.3)
NEUTROPHILS NFR BLD AUTO: 83 %
NRBC # BLD AUTO: 0 10E3/UL
NRBC BLD AUTO-RTO: 0 /100
PLATELET # BLD AUTO: 345 10E3/UL (ref 150–450)
POTASSIUM SERPL-SCNC: 4.9 MMOL/L (ref 3.4–5.3)
PROT SERPL-MCNC: 6.8 G/DL (ref 6.4–8.3)
RBC # BLD AUTO: 5.61 10E6/UL (ref 4.4–5.9)
SODIUM SERPL-SCNC: 133 MMOL/L (ref 135–145)
WBC # BLD AUTO: 16 10E3/UL (ref 4–11)

## 2023-11-16 PROCEDURE — 74177 CT ABD & PELVIS W/CONTRAST: CPT | Mod: 26 | Performed by: RADIOLOGY

## 2023-11-16 PROCEDURE — 99285 EMERGENCY DEPT VISIT HI MDM: CPT | Mod: 25 | Performed by: EMERGENCY MEDICINE

## 2023-11-16 PROCEDURE — 74177 CT ABD & PELVIS W/CONTRAST: CPT

## 2023-11-16 PROCEDURE — 36415 COLL VENOUS BLD VENIPUNCTURE: CPT | Performed by: EMERGENCY MEDICINE

## 2023-11-16 PROCEDURE — 85048 AUTOMATED LEUKOCYTE COUNT: CPT | Performed by: EMERGENCY MEDICINE

## 2023-11-16 PROCEDURE — 99284 EMERGENCY DEPT VISIT MOD MDM: CPT | Performed by: EMERGENCY MEDICINE

## 2023-11-16 PROCEDURE — 80053 COMPREHEN METABOLIC PANEL: CPT | Performed by: EMERGENCY MEDICINE

## 2023-11-16 PROCEDURE — 250N000011 HC RX IP 250 OP 636: Performed by: EMERGENCY MEDICINE

## 2023-11-16 RX ORDER — IOPAMIDOL 755 MG/ML
90 INJECTION, SOLUTION INTRAVASCULAR ONCE
Status: COMPLETED | OUTPATIENT
Start: 2023-11-16 | End: 2023-11-16

## 2023-11-16 RX ADMIN — IOPAMIDOL 90 ML: 755 INJECTION, SOLUTION INTRAVENOUS at 14:10

## 2023-11-16 ASSESSMENT — ACTIVITIES OF DAILY LIVING (ADL)
ADLS_ACUITY_SCORE: 33
ADLS_ACUITY_SCORE: 35

## 2023-11-16 NOTE — ED TRIAGE NOTES
PT BIBA [facility] for constipation for one month. He was seen at [facility] 2 days ago and got an enema. He says the enema didn't work. He did not take his prescribed stool softeners. Pt VSS. Ambulatory. No fevers. Nothing given en route      Per family- pt was admitted to [facility] for 3-4 days over the weekend. Per family he has not been eating, he has not been drinking. He has had a CT on Tuesday showing stool burden on the L side. Family says he had a tear in his kidneys.
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
## 2023-11-16 NOTE — TELEPHONE ENCOUNTER
Okay for orders as requested including changing Castle catheter.  In terms of constipation, is he using the MiraLAX?

## 2023-11-16 NOTE — ED NOTES
Received call from urology, they have been consulted earlier based on preliminary CT read which was concerning for calyceal rupture.  However, over read more concern for adrenal hemorrhage.  Patient has been discharged home at this time.     Patient was discussed with Dr. Phillip of the surgery service, he would recommend outpatient follow-up with Dr. Daly/the minimally invasive team for repeat CT versus MRI to ensure resolution/evaluate for underlying mass or other etiology that would explain hemorrhage in this area.  Unclear why patient would have an adrenal bleed.  Possible there is a small mass that may be obscured by the current hemorrhage.  Per my review of the patient's ER chart from today, does not appear he reported any recent trauma that could explain this occurrence.  The bleed is unilateral, not c/w Waterhouse-Friderichsen syndrome.    I discussed recommended follow-up plan with on-call general surgery resident who recommended that I contact Bonita Vance, the care coordinator for Dr. Daly.  I have sent her a message to attempt to facilitate this follow-up.    [id number] Attempted to reach the patient by phone with his provided contact information ([phone]).  However, I reached the patient's voicemail.  Left a voicemail stating that it is recommended the patient follow-up in clinic regarding his CT results from today and informed him that he should receive a call to schedule this follow-up. I advised him to return to the emergency department if his symptoms should worsen, and to call the emergency department back if he has additional questions.    CT results from today                                                            IMPRESSION:   1. New dense collection within the left anterior pararenal space  compared to CT 11/10/2023, likely represents left adrenal hemorrhage,  No peripheral enhancement or internal air to suggest an organized  infectious collection.  2. Large colonic stool burden  within the cecum and ascending colon,  increased from prior.  3. Near-complete resolution of bilateral perinephric stranding.  4. Stable right paracaval conglomeration mass of lymph nodes.  5. Cholelithiasis.     Queenie Bill MD  11/16/23 1658       Queenie Bill MD  11/16/23 1700

## 2023-11-17 ENCOUNTER — DOCUMENTATION ONLY (OUTPATIENT)
Dept: OTHER | Facility: CLINIC | Age: 83
End: 2023-11-17
Payer: COMMERCIAL

## 2023-11-17 ENCOUNTER — PATIENT OUTREACH (OUTPATIENT)
Dept: CARE COORDINATION | Facility: CLINIC | Age: 83
End: 2023-11-17
Payer: COMMERCIAL

## 2023-11-20 LAB
ATRIAL RATE - MUSE: 75 BPM
DIASTOLIC BLOOD PRESSURE - MUSE: NORMAL MMHG
INTERPRETATION ECG - MUSE: NORMAL
P AXIS - MUSE: 78 DEGREES
PR INTERVAL - MUSE: 142 MS
QRS DURATION - MUSE: 96 MS
QT - MUSE: 400 MS
QTC - MUSE: 446 MS
R AXIS - MUSE: -71 DEGREES
SYSTOLIC BLOOD PRESSURE - MUSE: NORMAL MMHG
T AXIS - MUSE: 25 DEGREES
VENTRICULAR RATE- MUSE: 75 BPM

## 2023-11-20 NOTE — TELEPHONE ENCOUNTER
REFERRAL INFORMATION:  Referring Provider:   Referring Clinic:   Reason for Visit/Diagnosis: Adrenal hemorrhage        FUTURE VISIT INFORMATION:  Appointment Date: 11/29/2023  Appointment Time: 10:30 AM     NOTES RECORD STATUS  DETAILS   OFFICE NOTE from Referring Provider N/A    OFFICE NOTE from [facility] Care Everywhere / Internal [facility]:  11/10/23 - PCC OV with LUKAS Dorantes    [facility]:  4/20/22 - GEN SURG OV with Dr. Maier   HOSPITAL DISCHARGE SUMMARY/ ED VISITS  Care Everywhere / Internal [facility]:  11/16/23 - ED OV with Dr. Luma Lund [facility]:  11/14/23 - ED OV with Dr. Burroughs  11/10/23 - Admission with Dr. Srinivas [facility]:  11/6/23 - ED OV with Dr. Acosta  11/2/23 - ED OV with Dr. Amezcua   OPERATIVE REPORT N/A    PERTINENT LABS Care Everywhere / Internal    IMAGING (CT, MRI, US, XR)  Received / Internal [facility]:  11/16/23, 11/14/23, 11/10/23 - CT Abd/pelvis  11/13/23 - US Renal    [facility]:  11/6/23 - CT Abd/Pelvis  11/2/23 - XR Abdomen  3/14/22 - MRI Pelvis  3/14/22 - MRI Abdomen  3/14/22 - CT Chest  2/23/22 - CT Renal     Records Requested    Facility  [facility]  Fax: [phone]   Outcome * 11/20/23 12:56 PM Faxed urgent request to [facility] for images to be pushed to [facility] PACs. - Doris    * 11/21/23 7:26 AM Images received from [facility] and attached to the patient in PACs. - Doris

## 2023-11-22 ENCOUNTER — MEDICAL CORRESPONDENCE (OUTPATIENT)
Dept: HEALTH INFORMATION MANAGEMENT | Facility: CLINIC | Age: 83
End: 2023-11-22

## 2023-11-22 ENCOUNTER — OFFICE VISIT (OUTPATIENT)
Dept: FAMILY MEDICINE | Facility: CLINIC | Age: 83
End: 2023-11-22
Payer: COMMERCIAL

## 2023-11-22 VITALS
RESPIRATION RATE: 22 BRPM | SYSTOLIC BLOOD PRESSURE: 99 MMHG | BODY MASS INDEX: 21.96 KG/M2 | HEART RATE: 76 BPM | OXYGEN SATURATION: 97 % | WEIGHT: 136 LBS | TEMPERATURE: 97.5 F | DIASTOLIC BLOOD PRESSURE: 65 MMHG

## 2023-11-22 DIAGNOSIS — C49.9 LIPOSARCOMA (H): Primary | ICD-10-CM

## 2023-11-22 PROCEDURE — 99214 OFFICE O/P EST MOD 30 MIN: CPT | Performed by: FAMILY MEDICINE

## 2023-11-22 ASSESSMENT — ENCOUNTER SYMPTOMS: ABDOMINAL PAIN: 1

## 2023-11-22 NOTE — PROGRESS NOTES
"  Assessment & Plan     Liposarcoma (H)  Patient is not wishing to pursue further treatment  He has not changed his mind since previous  Reviewed imaging with himself significant other and her daughter  This time patient does not want to continue her return to the emergency room or hospital  Like to be comfortable at home  Discussed referral to hospice which patient was in agreement  - Hospice Referral; Future      BMI:   Estimated body mass index is 21.96 kg/m  as calculated from the following:    Height as of 11/11/23: 1.676 m (5' 5.98\").    Weight as of this encounter: 61.7 kg (136 lb).       Gera Mehta MD  [facility]    Chema Villa is a 83 year old, presenting for the following health issues:  Abdominal Pain (Constipation, small BM this morning, multiple ER visits and recent hospitalization, decrease in appetite, kidney issues - discuss CT finding )        11/22/2023     9:43 AM   Additional Questions   Roomed by Rosa Maria MIRANDA CMA   Accompanied by significant other and step daughter   83-year-old gentleman who have not seen in roughly 4 years presenting today for follow-up for multiple emergency room visits in the last few weeks due to constipation issues and abdominal pain.  In the last couple years patient was diagnosed with a liposarcoma which he elected not to pursue further treatment he was suggested at the time for surgical resection which he did not want to undergo.  He continue to avoid any active further treatment in the last year.  Recent imaging has shown an increase in size of the right-sided mass in the retroperitoneal area there is now possibly a new lesion on the left side versus injury to the spleen there is lymphadenopathy noted in areas he is having recurrent constipation and abdominal pain.  He has been having some weight loss and appetite has not been good.  Spent time reviewing records and imaging with him today-and respecting his wishes not to pursue " further treatment but wanted to be comfortable at home and avoid hospitalization discussed with him hospice.  With his suspected spread of liposarcoma in the setting of decreasing energy weight loss I do believe that he would be a candidate for hospice.  Clear discussion with the patient today which he understands and he would appreciate referral.  Questions were answered from his significant other and her daughter.  Discussed modalities to try to keep bowel movements more regularly.  Patient is down roughly 44 pounds in the last 5 months since being seen at clinic.    History of Present Illness       Reason for visit:  Er visits follow-up,constipation, kidney, adrenal rupture  Symptom onset:  More than a month  Symptoms include:  Unable to have bowel movement  Symptom intensity:  Moderate  Symptom progression:  Improving  Had these symptoms before:  No    He eats 2-3 servings of fruits and vegetables daily.He consumes 2 sweetened beverage(s) daily.He exercises with enough effort to increase his heart rate 9 or less minutes per day.  He exercises with enough effort to increase his heart rate 3 or less days per week.   He is taking medications regularly.           11/20/2023    11:31 AM   Post Discharge Outreach   Admission Date 11/10/2023   Reason for Admission Urine extravasation   Discharge Date 11/13/2023   Discharge Diagnosis Urine extravasation   How are you doing now that you are home? good now. was in ED twice since discharging from hospital with constipation.   How are your symptoms? (Red Flag symptoms escalate to triage hotline per guidelines) Improved   Do you feel your condition is stable enough to be safe at home until your provider visit? Yes   Does the patient have their discharge instructions?  Yes   Does the patient have questions regarding their discharge instructions?  No   Were you started on any new medications or were there changes to any of your previous medications?  Yes   Does the patient  have all of their medications? Yes   Do you have questions regarding any of your medications?  No   Do you have all of your needed medical supplies or equipment (DME)?  (i.e. oxygen tank, CPAP, cane, etc.) Yes   Discharge follow-up appointment scheduled within 14 calendar days?  Yes   Discharge Follow Up Appointment Date 11/22/2023   Discharge Follow Up Appointment Scheduled with? Primary Care Provider         Summary of hospitalization:  [facility] discharge summary reviewed  Diagnostic Tests/Treatments reviewed.  Follow up needed: referral to Hospice  Other Healthcare Providers Involved in Patient s Care:         Hospice  Update since discharge: stable.         Plan of care communicated with patient and caregiver               Review of Systems   Gastrointestinal:  Positive for abdominal pain.            Objective    BP 99/65 (BP Location: Left arm, Patient Position: Sitting, Cuff Size: Adult Regular)   Pulse 76   Temp 97.5  F (36.4  C) (Oral)   Resp 22   Wt 61.7 kg (136 lb)   SpO2 97%   BMI 21.96 kg/m    Body mass index is 21.96 kg/m .  Physical Exam   GENERAL: alert, no distress, frail, and fatigued  RESP: lungs clear to auscultation - no rales, rhonchi or wheezes  CV: regular rate and rhythm, normal S1 S2, no S3 or S4, no murmur, click or rub, no peripheral edema and peripheral pulses strong  MS: no gross musculoskeletal defects noted, no edema  PSYCH: mentation appears normal, affect normal/bright

## 2023-11-24 ENCOUNTER — TELEPHONE (OUTPATIENT)
Dept: ENDOCRINOLOGY | Facility: CLINIC | Age: 83
End: 2023-11-24
Payer: COMMERCIAL

## 2023-11-24 NOTE — TELEPHONE ENCOUNTER
Spoke with pt but did not wish to schedule at this time and will stick with hospice   Lien [place] on 11/24/2023 at 10:55 AM

## 2023-11-27 ENCOUNTER — MEDICAL CORRESPONDENCE (OUTPATIENT)
Dept: HEALTH INFORMATION MANAGEMENT | Facility: CLINIC | Age: 83
End: 2023-11-27
Payer: COMMERCIAL

## 2023-11-29 ENCOUNTER — PRE VISIT (OUTPATIENT)
Dept: SURGERY | Facility: CLINIC | Age: 83
End: 2023-11-29

## 2023-12-01 ENCOUNTER — MEDICAL CORRESPONDENCE (OUTPATIENT)
Dept: HEALTH INFORMATION MANAGEMENT | Facility: CLINIC | Age: 83
End: 2023-12-01
Payer: COMMERCIAL

## 2023-12-06 ENCOUNTER — MEDICAL CORRESPONDENCE (OUTPATIENT)
Dept: HEALTH INFORMATION MANAGEMENT | Facility: CLINIC | Age: 83
End: 2023-12-06
Payer: COMMERCIAL

## 2023-12-06 ENCOUNTER — MEDICAL CORRESPONDENCE (OUTPATIENT)
Dept: HEALTH INFORMATION MANAGEMENT | Facility: CLINIC | Age: 83
End: 2023-12-06

## 2023-12-06 ENCOUNTER — TELEPHONE (OUTPATIENT)
Dept: FAMILY MEDICINE | Facility: CLINIC | Age: 83
End: 2023-12-06
Payer: COMMERCIAL

## 2023-12-06 NOTE — TELEPHONE ENCOUNTER
Home Health Care    Reason for call:  Verbal order request    Orders are needed for this patient.  Incoming call from Mariano with [facility] requesting verbal orders to OK pt to transfer from [facility] to [facility]. Need orders ASAP as they, Eyad, has an appointment scheduled with pt this evening at 5:00pm  Transfer due to family request    Pt Provider: Gera Mehta    Phone Number Homecare Nurse can be reached at: [phone], Carmen    Can we leave a detailed message on this number? YES

## 2023-12-08 ENCOUNTER — MEDICAL CORRESPONDENCE (OUTPATIENT)
Dept: HEALTH INFORMATION MANAGEMENT | Facility: CLINIC | Age: 83
End: 2023-12-08
Payer: COMMERCIAL

## 2023-12-11 ENCOUNTER — TELEPHONE (OUTPATIENT)
Dept: FAMILY MEDICINE | Facility: CLINIC | Age: 83
End: 2023-12-11
Payer: COMMERCIAL

## 2023-12-11 NOTE — TELEPHONE ENCOUNTER
General Call      Reason for Call:  Calling in to have orders signed, dated and faxed back to [phone].  Orders re-printed and handed off to Rosa Maria in [facility].

## 2023-12-13 ENCOUNTER — MEDICAL CORRESPONDENCE (OUTPATIENT)
Dept: HEALTH INFORMATION MANAGEMENT | Facility: CLINIC | Age: 83
End: 2023-12-13
Payer: COMMERCIAL

## 2023-12-14 ENCOUNTER — MEDICAL CORRESPONDENCE (OUTPATIENT)
Dept: HEALTH INFORMATION MANAGEMENT | Facility: CLINIC | Age: 83
End: 2023-12-14
Payer: COMMERCIAL

## 2023-12-14 ENCOUNTER — TELEPHONE (OUTPATIENT)
Dept: FAMILY MEDICINE | Facility: CLINIC | Age: 83
End: 2023-12-14

## 2023-12-14 NOTE — TELEPHONE ENCOUNTER
General Call      Reason for Call:  forms    What are your questions or concerns:  Plan of Care form was faxed over and signed by a provider other than Dr. Vera. Form cannot be accepted by Medicare with a different provider signature. Kristine re-faxing form to have resigned by PCP/Dr. Vera, please advise, and have Dr. Vera sign form ASAP    Date of last appointment with provider: 6/1/23    Could we send this information to you in [employer] or would you prefer to receive a phone call?:   Patient would prefer a phone call   Okay to leave a detailed message?: Yes at Other phone number:  [phone] ex [phone] for Kristine (lexi Sarabia)   Received signout from Dr. Vaughan to admit patient to medicine ; ortho reducing wrist at bedside. Late note: Pt lives alone and uses cane for ambulation. Has shoulder and wrist fractures. Presently very uncomfortable even with iv pain meds. Pt will not be safe discharge and requires admission. Alexus SUAREZ

## 2024-01-17 ENCOUNTER — MEDICAL CORRESPONDENCE (OUTPATIENT)
Dept: HEALTH INFORMATION MANAGEMENT | Facility: CLINIC | Age: 84
End: 2024-01-17
Payer: COMMERCIAL

## 2024-02-19 ENCOUNTER — MEDICAL CORRESPONDENCE (OUTPATIENT)
Dept: HEALTH INFORMATION MANAGEMENT | Facility: CLINIC | Age: 84
End: 2024-02-19
Payer: COMMERCIAL

## 2024-06-01 ENCOUNTER — HEALTH MAINTENANCE LETTER (OUTPATIENT)
Age: 84
End: 2024-06-01

## 2024-11-07 ENCOUNTER — TELEPHONE (OUTPATIENT)
Dept: FAMILY MEDICINE | Facility: CLINIC | Age: 84
End: 2024-11-07

## 2024-11-07 NOTE — TELEPHONE ENCOUNTER
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 Spouse was there visiting and left the room. When she came back in he had passed away. She is taking this pretty hard and stated they are having someone stop by her home today to check on her.     Does caller want a call/response back: No